# Patient Record
Sex: FEMALE | Race: WHITE
[De-identification: names, ages, dates, MRNs, and addresses within clinical notes are randomized per-mention and may not be internally consistent; named-entity substitution may affect disease eponyms.]

---

## 2018-03-15 ENCOUNTER — HOSPITAL ENCOUNTER (EMERGENCY)
Dept: HOSPITAL 41 - JD.ED | Age: 73
Discharge: HOME | End: 2018-03-15
Payer: MEDICARE

## 2018-03-15 VITALS — DIASTOLIC BLOOD PRESSURE: 76 MMHG | SYSTOLIC BLOOD PRESSURE: 159 MMHG

## 2018-03-15 DIAGNOSIS — R10.10: Primary | ICD-10-CM

## 2018-03-15 DIAGNOSIS — Z88.0: ICD-10-CM

## 2018-03-15 DIAGNOSIS — Z88.5: ICD-10-CM

## 2018-03-15 DIAGNOSIS — I10: ICD-10-CM

## 2018-03-15 DIAGNOSIS — Z79.899: ICD-10-CM

## 2018-03-15 DIAGNOSIS — E03.9: ICD-10-CM

## 2018-03-15 NOTE — EDM.PDOC
ED HPI GENERAL MEDICAL PROBLEM





- General


Chief Complaint: Abdominal Pain


Stated Complaint: ABDOMINAL PAIN


Time Seen by Provider: 03/15/18 10:13


Source of Information: Reports: Patient, RN Notes Reviewed





- History of Present Illness


INITIAL COMMENTS - FREE TEXT/NARRATIVE: 





72 year old female comes in with intermitant upper abd pain and pain  LLQ for 

about 1 week.  Was worse this morning, now almost gone.  No fever or chills.  

No N/V or diarrhea,  occas. constipation, last BM yesterday.  No chest pain or 

difficulty breathing.  Hx of prior appy about 8 yrs ago.  


  ** Abdominal


Pain Score (Numeric/FACES): 7





- Related Data


 Allergies











Allergy/AdvReac Type Severity Reaction Status Date / Time


 


hydromorphone HCl Allergy  Syncope Verified 03/15/18 10:12





[From Dilaudid]     


 


Penicillins Allergy  Hives Verified 03/15/18 10:12











Home Meds: 


 Home Meds





Escitalopram Oxalate [Lexapro] 20 mg PO DAILY 07/04/16 [History]


Levothyroxine 175 mcg PO ACBRK 07/04/16 [History]


Lisinopril/Hydrochlorothiazide [Lisinopril-Hctz 20-12.5 mg Tab] 1 tab PO BID 07/ 04/16 [History]











Past Medical History


Cardiovascular History: Reports: Hypertension


OB/GYN History: Reports: Pregnancy


Musculoskeletal History: Reports: Arthritis


Psychiatric History: Reports: Anxiety


Endocrine/Metabolic History: Reports: Hypothyroidism





- Infectious Disease History


Infectious Disease History: Reports: Chicken Pox, Measles, Mumps





- Past Surgical History


GI Surgical History: Reports: Appendectomy, Other (See Below)


Other GI Surgeries/Procedures: peritonitis from ruptured appendix





Social & Family History





- Tobacco Use


Smoking Status *Q: Never Smoker





- Recreational Drug Use


Recreational Drug Use: No





ED ROS GENERAL





- Review of Systems


Review Of Systems: See Below


Constitutional: Denies: Fever, Chills


HEENT: Denies: Throat Pain


Respiratory: Denies: Shortness of Breath, Pleuritic Chest Pain, Cough


Cardiovascular: Denies: Chest Pain, Palpitations


GI/Abdominal: Reports: Abdominal Pain.  Denies: Constipation, Diarrhea, 

Decreased Appetite, Nausea, Vomiting


Musculoskeletal: Reports: No Symptoms


Skin: Reports: No Symptoms


Neurological: Reports: No Symptoms





ED EXAM, GI/ABD





- Physical Exam


Exam: See Below


General Appearance: Alert, No Apparent Distress


Throat/Mouth: Normal Inspection


Head: Atraumatic


Neck: Supple


Respiratory/Chest: No Respiratory Distress, Lungs Clear, Normal Breath Sounds


Cardiovascular: Normal Peripheral Pulses, Regular Rate, Rhythm


GI/Abdominal Exam: Soft, Other (minimal tenderness upper mid abd,  LLQ and 

remainder of abd nontender at this time)


Back Exam: No: CVA Tenderness (L), CVA Tenderness (R)


Extremities: Normal Inspection.  No: Pedal Edema, Leg Pain


Neurological: Alert, Oriented, No Motor/Sensory Deficits





Course





- Vital Signs


Last Recorded V/S: 


 Last Vital Signs











Temp  98.5 F   03/15/18 10:12


 


Pulse  77   03/15/18 10:12


 


Resp  20   03/15/18 10:12


 


BP  159/76 H  03/15/18 10:12


 


Pulse Ox  96   03/15/18 10:12














- Orders/Labs/Meds


Orders: 


 Active Orders 24 hr











 Category Date Time Status


 


 Abdomen 2V AP Flat Upright [CR] Stat Exams  03/15/18 11:09 Taken











Labs: 


 Laboratory Tests











  03/15/18 03/15/18 Range/Units





  11:51 11:51 


 


WBC   6.57  (3.98-10.04)  K/mm3


 


RBC   4.32  (3.98-5.22)  M/mm3


 


Hgb   12.5  (11.2-15.7)  gm/L


 


Hct   38.9  (34.1-44.9)  %


 


MCV   90.0  (79.4-94.8)  fl


 


MCH   28.9  (25.6-32.2)  pg


 


MCHC   32.1 L  (32.2-35.5)  g/dl


 


RDW Std Deviation   44.4  (36.4-46.3)  fL


 


Plt Count   249  (182-369)  K/mm3


 


MPV   10.8  (9.4-12.3)  fl


 


Neut % (Auto)   64.9  (34.0-71.1)  %


 


Lymph % (Auto)   24.7  (19.3-51.7)  %


 


Mono % (Auto)   6.2  (4.7-12.5)  %


 


Eos % (Auto)   3.2  (0.7-5.8)  


 


Baso % (Auto)   0.5  (0.1-1.2)  %


 


Neut # (Auto)   4.27  (1.56-6.13)  K/mm3


 


Lymph # (Auto)   1.62  (1.18-3.74)  K/mm3


 


Mono # (Auto)   0.41 H  (0.24-0.36)  K/mm3


 


Eos # (Auto)   0.21  (0.04-0.36)  K/mm3


 


Baso # (Auto)   0.03  (0.01-0.08)  K/mm3


 


C-Reactive Protein  1.9 H*   (<1.0)  mg/dL














- Re-Assessments/Exams


Free Text/Narrative Re-Assessment/Exam: 





03/15/18 15:10


WBC good, CRP very mildly elevated,  I did not feel any sign of hernia on 

initial exam,  on repeat exam when sitting she does have a bit of a buldge to 

the L of her inciscion when pressure applied to abd wall.  X rays are fine.  

Discharge instr. as documeted. 





Departure





- Departure


Time of Disposition: 12:53


Disposition: Home, Self-Care 01


Condition: Fair


Clinical Impression: 


Abdominal pain


Qualifiers:


 Abdominal location: upper abdomen, unspecified Qualified Code(s): R10.10 - 

Upper abdominal pain, unspecified








- Discharge Information


Instructions:  Abdominal Pain, Adult, Easy-to-Read


Referrals: 


PCP,Not In Area [Primary Care Provider] - 


Forms:  ED Department Discharge


Additional Instructions: 


start a stool softener once or twice daily,  drink plenty of water to maintain 

hydration, high fiber diet,  See Dr Sullivan, General Surgeon at our [facility] medical 

clinic next available appt.  Call [phone] for appt.   Return to ED as needed 

if symptoms worsening in any way.  





- My Orders


Last 24 Hours: 


My Active Orders





03/15/18 11:09


Abdomen 2V AP Flat Upright [CR] Stat 














- Assessment/Plan


Last 24 Hours: 


My Active Orders





03/15/18 11:09


Abdomen 2V AP Flat Upright [CR] Stat

## 2018-03-16 NOTE — CR
Abdomen: Supine and upright views of the abdomen were obtained.

 

Comparison: No previous abdominal x-ray.

 

Bowel gas pattern appears normal.  Slight scoliosis is present within 

the spine with minimal degenerative change.  Small calcification is 

seen within the left pelvis compatible with incidental phlebolith.  No

 other abnormal calcifications are seen.  No soft tissue abnormality 

is seen.  No free air is seen.

 

Impression:

1.  Incidental findings.

 

Diagnostic code #2

## 2018-04-01 ENCOUNTER — HOSPITAL ENCOUNTER (INPATIENT)
Dept: HOSPITAL 41 - JD.ED | Age: 73
LOS: 2 days | Discharge: HOME | DRG: 309 | End: 2018-04-03
Payer: MEDICARE

## 2018-04-01 DIAGNOSIS — M25.569: ICD-10-CM

## 2018-04-01 DIAGNOSIS — N20.0: ICD-10-CM

## 2018-04-01 DIAGNOSIS — E03.9: ICD-10-CM

## 2018-04-01 DIAGNOSIS — E05.80: ICD-10-CM

## 2018-04-01 DIAGNOSIS — E66.01: ICD-10-CM

## 2018-04-01 DIAGNOSIS — I10: ICD-10-CM

## 2018-04-01 DIAGNOSIS — F41.9: ICD-10-CM

## 2018-04-01 DIAGNOSIS — E83.42: ICD-10-CM

## 2018-04-01 DIAGNOSIS — I48.91: Primary | ICD-10-CM

## 2018-04-01 DIAGNOSIS — I95.2: ICD-10-CM

## 2018-04-01 DIAGNOSIS — T43.615A: ICD-10-CM

## 2018-04-01 DIAGNOSIS — Z88.8: ICD-10-CM

## 2018-04-01 DIAGNOSIS — F32.9: ICD-10-CM

## 2018-04-01 DIAGNOSIS — Z88.0: ICD-10-CM

## 2018-04-01 DIAGNOSIS — Z79.899: ICD-10-CM

## 2018-04-01 DIAGNOSIS — M19.90: ICD-10-CM

## 2018-04-01 DIAGNOSIS — R79.1: ICD-10-CM

## 2018-04-01 DIAGNOSIS — Z79.82: ICD-10-CM

## 2018-04-01 PROCEDURE — 85025 COMPLETE CBC W/AUTO DIFF WBC: CPT

## 2018-04-01 PROCEDURE — 99285 EMERGENCY DEPT VISIT HI MDM: CPT

## 2018-04-01 PROCEDURE — 83880 ASSAY OF NATRIURETIC PEPTIDE: CPT

## 2018-04-01 PROCEDURE — 81001 URINALYSIS AUTO W/SCOPE: CPT

## 2018-04-01 PROCEDURE — 85379 FIBRIN DEGRADATION QUANT: CPT

## 2018-04-01 PROCEDURE — 96365 THER/PROPH/DIAG IV INF INIT: CPT

## 2018-04-01 PROCEDURE — 85610 PROTHROMBIN TIME: CPT

## 2018-04-01 PROCEDURE — 71045 X-RAY EXAM CHEST 1 VIEW: CPT

## 2018-04-01 PROCEDURE — 80053 COMPREHEN METABOLIC PANEL: CPT

## 2018-04-01 PROCEDURE — 84443 ASSAY THYROID STIM HORMONE: CPT

## 2018-04-01 PROCEDURE — 85730 THROMBOPLASTIN TIME PARTIAL: CPT

## 2018-04-01 PROCEDURE — 36415 COLL VENOUS BLD VENIPUNCTURE: CPT

## 2018-04-01 PROCEDURE — 84439 ASSAY OF FREE THYROXINE: CPT

## 2018-04-01 PROCEDURE — 84484 ASSAY OF TROPONIN QUANT: CPT

## 2018-04-01 PROCEDURE — 71275 CT ANGIOGRAPHY CHEST: CPT

## 2018-04-01 PROCEDURE — 83036 HEMOGLOBIN GLYCOSYLATED A1C: CPT

## 2018-04-01 PROCEDURE — 96376 TX/PRO/DX INJ SAME DRUG ADON: CPT

## 2018-04-01 PROCEDURE — 96375 TX/PRO/DX INJ NEW DRUG ADDON: CPT

## 2018-04-01 PROCEDURE — 83735 ASSAY OF MAGNESIUM: CPT

## 2018-04-01 RX ADMIN — POTASSIUM CHLORIDE SCH MEQ: 1500 TABLET, EXTENDED RELEASE ORAL at 20:24

## 2018-04-01 RX ADMIN — POTASSIUM CHLORIDE SCH MEQ: 1500 TABLET, EXTENDED RELEASE ORAL at 18:15

## 2018-04-01 NOTE — CT
CT chest

 

Technique: Multiple axial sections through the chest were obtained.  

Intravenous contrast was utilized.  Study has been performed as a 

pulmonary angiogram protocol.

 

Comparison: Prior chest x-ray performed on the same day at time (12:34

 PM)

 

Findings: Pulmonary arteries are well-opacified.  There are no filling

 defects seen to indicate pulmonary embolism.  Mild coronary artery 

calcification is seen.  Aorta shows no aneurysmal dilatation or 

dissection.  Mediastinum and hilar regions show no adenopathy or mass.

  No axillary adenopathy is seen.  No pericardial thickening is 

identified.  Increased density is noted within the left kidney.  This 

increased density appears to represent a partially visualized staghorn

 calculus, noncontrast CT would be needed to confirm.  Other portions 

of the visualized upper abdominal structures shows a small hiatal 

hernia but otherwise appear unremarkable.

 

Lungs are clear.  No pleural effusions or acute parenchymal changes 

seen.  No pneumothorax is seen.

 

Bone window settings were reviewed which shows mild scattered 

degenerative change within the spine.

 

Impression:

1.  No findings of pulmonary embolism.

2.  Questionable partially visualized staghorn calculus within the 

left kidney.  Noncontrast CT abdomen study could be obtained to 

confirm if clinically needed.  This can be performed non-emergently.

3.  Other incidental findings.

 

Diagnostic code #2

## 2018-04-01 NOTE — PCM.HP
H&P History of Present Illness





- General


Date of Service: 04/01/18


Admit Problem/Dx: 


New Onset of Atrial Fibrillation with RVR





Source of Information: Patient, Old Records, Provider, RN Notes Reviewed


History Limitations: Reports: No Limitations





- History of Present Illness


Initial Comments - Free Text/Narative: 


This is a 73 yo elderly white female with past medical hx/o HTN, OA/DJD, 

Hypothyroidism and Morbid Obesity who comes in to day for heart palpitations 

that has been going on for a month now, on and off. She does not follow a 

cardiologist and she lives [place] [facility]. She is a retired nurse and 

according to her, if her heart palpitation comes on, she usually terminates on 

her own by hitting her chest and with abrupt short cough.





Upon presentation to ED, she was found with a HR in the 150s. Her initial work 

up in ED shows a CBC remarkable for WBC of 10.05, and Lymphocytes of 41%. Her 

coagulation studies show a PT of 10.7, INR of 1, APTT of 28 and D-Dimer of 

1.11. Her chemistry is significant for AG of 17.8, BUN of 24, BS of 120, Mg of 

1.6, ProBNP of 348, FT4 of 1.73 and TSH of 0.318. Her UA is negative for UTI. 

Her CXR and Chest CT scan both show no acute abnormal findings.





Patient is being admitted for medical management of atrial fibrillation with 

rvr. She is full code.


  








- Related Data


Allergies/Adverse Reactions: 


 Allergies











Allergy/AdvReac Type Severity Reaction Status Date / Time


 


Penicillins Allergy  Hives Verified 04/01/18 17:26


 


hydromorphone HCl AdvReac  Syncope Verified 04/02/18 07:20





[From Dilaudid]     











Home Medications: 


 Home Meds





Escitalopram Oxalate [Lexapro] 20 mg PO DAILY 07/04/16 [History]


Levothyroxine 175 mcg PO ACBRK 07/04/16 [History]


Lisinopril/Hydrochlorothiazide [Lisinopril-Hctz 20-12.5 mg Tab] 1 tab PO BID 07/ 04/16 [History]


Acetaminophen [Tylenol Extra Strength] 500 mg PO BID 04/01/18 [History]











Past Medical History


Cardiovascular History: Reports: Hypertension


OB/GYN History: Reports: Pregnancy


Musculoskeletal History: Reports: Arthritis


Psychiatric History: Reports: Anxiety


Endocrine/Metabolic History: Reports: Hypothyroidism





- Infectious Disease History


Infectious Disease History: Reports: Chicken Pox, Measles, Mumps





- Past Surgical History


GI Surgical History: Reports: Appendectomy, Hernia, Abdominal, Other (See Below)


Other GI Surgeries/Procedures: peritonitis from ruptured appendix





Social & Family History





- Tobacco Use


Smoking Status *Q: Never Smoker





- Caffeine Use


Caffeine Use: Reports: Coffee, Soda, Tea





- Recreational Drug Use


Recreational Drug Use: No





H&P Review of Systems





- Review of Systems:


Review Of Systems: See Below


General: Denies: Fever, Chills, Malaise, Weakness, Fatigue


HEENT: Reports: No Symptoms


Pulmonary: Denies: Shortness of Breath


Cardiovascular: Reports: Palpitations, Dyspnea on Exertion, Edema.  Denies: 

Chest Pain, Orthopnea, Lightheadedness, Syncope, Claudication, Blood Pressure 

Problem


Gastrointestinal: Denies: Abdominal Pain, Decreased Appetite, Nausea, Vomiting


Genitourinary: Reports: No Symptoms


Musculoskeletal: Reports: Joint Pain, Other (knee pain)


Skin: Denies: Cyanosis


Psychiatric: Denies: Depression, Anxiety, Agitation, Hallucinations


Neurological: Reports: Gait Disturbance.  Denies: Confusion, Difficulty Walking

, Weakness


Hematologic/Lymphatic: Reports: No Symptoms


Immunologic: Reports: No Symptoms





Exam





- Exam


Exam: See Below





- Vital Signs


Vital Signs: 


 Last Vital Signs











Temp  35.9 C   04/01/18 12:20


 


Pulse  156 H  04/01/18 12:20


 


Resp  23 H  04/01/18 12:20


 


BP  155/109 H  04/01/18 12:20


 


Pulse Ox  96   04/01/18 12:20











Weight: 141.974 kg





- Exam


General: Alert, Oriented, Cooperative


HEENT: Conjunctiva Clear, EACs Clear, EOMI, Hearing Intact, Mucosa Moist & Pink

, Nares Patent, Normal Nasal Septum, Posterior Pharynx Clear, Pupils Equal, 

Pupils Reactive


Neck: Supple, Trachea Midline


Lungs: Normal Respiratory Effort, Decreased Breath Sounds


Cardiovascular: Irregular Rhythm.  No: Systolic Murmur


GI/Abdominal Exam: Normal Bowel Sounds, Soft, Non-Tender, No Organomegaly, No 

Distention, No Abnormal Bruit, No Mass


 (Female) Exam: Deferred


Rectal (Female) Exam: Deferred


Back Exam: Normal Inspection, Decreased Range of Motion


Extremities: Normal Inspection, Normal Range of Motion, Non-Tender, No Pedal 

Edema, Normal Capillary Refill, Other (trace bilateral lower extremity edema)


Peripheral Pulses: 3+: Posterior Tibial (L), Posterior Tibial (R), Dorsalis 

Pedis (L), Dorsalis Pedis (R)


Skin: Warm, Dry, Intact


Neuro Extensive - Mental Status: Oriented x3, Normal Cognition, Memory Intact


Neuro Extensive - Motor, Sensory, Reflexes: CN II-XII Intact.  No: Normal Gait


Psychiatric: Alert, Normal Affect, Normal Mood





- Patient Data


Lab Results Last 24 hrs: 


 Laboratory Results - last 24 hr











  04/01/18 04/01/18 04/01/18 Range/Units





  12:24 12:24 12:24 


 


WBC  10.05 H    (3.98-10.04)  K/mm3


 


RBC  4.32    (3.98-5.22)  M/mm3


 


Hgb  12.4    (11.2-15.7)  gm/L


 


Hct  37.8    (34.1-44.9)  %


 


MCV  87.5    (79.4-94.8)  fl


 


MCH  28.7    (25.6-32.2)  pg


 


MCHC  32.8    (32.2-35.5)  g/dl


 


RDW Std Deviation  41.7    (36.4-46.3)  fL


 


Plt Count  296    (182-369)  K/mm3


 


MPV  10.7    (9.4-12.3)  fl


 


Neutrophils % (Manual)  52    (40-60)  %


 


Band Neutrophils %  0    (0-10)  %


 


Lymphocytes % (Manual)  41 H    (20-40)  %


 


Atypical Lymphs %  0    %


 


Monocytes % (Manual)  4    (2-10)  %


 


Eosinophils % (Manual)  3    (0.7-5.8)  %


 


Basophils % (Manual)  0 L    (0.1-1.2)  


 


Platelet Estimate  Adequate    


 


RBC Morph Comment  Normal    


 


PT     (8.0-13.0)  SECONDS


 


INR     


 


APTT     (22-36)  SECONDS


 


D-Dimer, Quantitative     (0.19-0.59)  mg/L


 


Sodium   139   (136-145)  mEq/L


 


Potassium   3.8   (3.5-5.1)  mEq/L


 


Chloride   101   ()  mEq/L


 


Carbon Dioxide   24   (21-32)  mEq/L


 


Anion Gap   17.8 H   (5-15)  


 


BUN   24 H   (7-18)  mg/dL


 


Creatinine   0.9   (0.55-1.02)  mg/dL


 


Est Cr Clr Drug Dosing   52.34   mL/min


 


Estimated GFR (MDRD)   > 60   (>60)  mL/min


 


BUN/Creatinine Ratio   26.7 H   (14-18)  


 


Glucose   120 H   ()  mg/dL


 


Calcium   9.0   (8.5-10.1)  mg/dL


 


Magnesium   1.6 L   (1.8-2.4)  mg/dl


 


Total Bilirubin   0.4   (0.2-1.0)  mg/dL


 


AST   15   (15-37)  U/L


 


ALT   19   (14-59)  U/L


 


Alkaline Phosphatase   73   ()  U/L


 


Troponin I   < 0.017   (0.00-0.056)  ng/mL


 


NT-Pro-B Natriuret Pep    348 H  (0-125)  pg/mL


 


Total Protein   7.5   (6.4-8.2)  g/dl


 


Albumin   3.4   (3.4-5.0)  g/dl


 


Globulin   4.1   gm/dL


 


Albumin/Globulin Ratio   0.8 L   (1-2)  


 


TSH 3rd Generation   0.318 L   (0.358-3.74)  uIU/mL














  04/01/18 04/01/18 04/01/18 Range/Units





  12:50 12:50 15:05 


 


WBC     (3.98-10.04)  K/mm3


 


RBC     (3.98-5.22)  M/mm3


 


Hgb     (11.2-15.7)  gm/L


 


Hct     (34.1-44.9)  %


 


MCV     (79.4-94.8)  fl


 


MCH     (25.6-32.2)  pg


 


MCHC     (32.2-35.5)  g/dl


 


RDW Std Deviation     (36.4-46.3)  fL


 


Plt Count     (182-369)  K/mm3


 


MPV     (9.4-12.3)  fl


 


Neutrophils % (Manual)     (40-60)  %


 


Band Neutrophils %     (0-10)  %


 


Lymphocytes % (Manual)     (20-40)  %


 


Atypical Lymphs %     %


 


Monocytes % (Manual)     (2-10)  %


 


Eosinophils % (Manual)     (0.7-5.8)  %


 


Basophils % (Manual)     (0.1-1.2)  


 


Platelet Estimate     


 


RBC Morph Comment     


 


PT  10.7    (8.0-13.0)  SECONDS


 


INR  1.00    


 


APTT  28    (22-36)  SECONDS


 


D-Dimer, Quantitative   1.11 H   (0.19-0.59)  mg/L


 


Sodium     (136-145)  mEq/L


 


Potassium     (3.5-5.1)  mEq/L


 


Chloride     ()  mEq/L


 


Carbon Dioxide     (21-32)  mEq/L


 


Anion Gap     (5-15)  


 


BUN     (7-18)  mg/dL


 


Creatinine     (0.55-1.02)  mg/dL


 


Est Cr Clr Drug Dosing     mL/min


 


Estimated GFR (MDRD)     (>60)  mL/min


 


BUN/Creatinine Ratio     (14-18)  


 


Glucose     ()  mg/dL


 


Calcium     (8.5-10.1)  mg/dL


 


Magnesium     (1.8-2.4)  mg/dl


 


Total Bilirubin     (0.2-1.0)  mg/dL


 


AST     (15-37)  U/L


 


ALT     (14-59)  U/L


 


Alkaline Phosphatase     ()  U/L


 


Troponin I    < 0.017  (0.00-0.056)  ng/mL


 


NT-Pro-B Natriuret Pep     (0-125)  pg/mL


 


Total Protein     (6.4-8.2)  g/dl


 


Albumin     (3.4-5.0)  g/dl


 


Globulin     gm/dL


 


Albumin/Globulin Ratio     (1-2)  


 


TSH 3rd Generation     (0.358-3.74)  uIU/mL











Result Diagrams: 


 04/02/18 05:25





 04/02/18 05:25





EKG INTERPRETATION


EKG Date: 04/01/18


Rhythm: A-Fib


Rate (Beats/Min): 147


QT: Normal


Problem List Initiated/Reviewed/Updated: Yes


Orders Last 24hrs: 


 Active Orders 24 hr











 Category Date Time Status


 


 Ambulate [RC] ASDIRECTED Care  04/01/18 15:33 Ordered


 


 Cardiac Monitoring [RC] CONTINUOUS Care  04/01/18 15:33 Ordered


 


 Height and Weight [RC] DAILY Care  04/01/18 15:33 Ordered


 


 Intake and Output [RC] QSHIFT Care  04/01/18 15:33 Ordered


 


 Oxygen Therapy [RC] ASDIRECTED Care  04/01/18 12:25 Active


 


 Oxygen Therapy [RC] PRN Care  04/01/18 15:33 Ordered


 


 Peripheral IV Care [RC] .AS DIRECTED Care  04/01/18 12:26 Active


 


 RT Aerosol Therapy [RC] ASDIRECTED Care  04/01/18 15:34 Ordered


 


 Up ad Nuris [RC] ASDIRECTED Care  04/01/18 15:33 Ordered


 


 VTE/DVT Education [RC] PER UNIT ROUTINE Care  04/01/18 15:33 Ordered


 


 Vital Signs [RC] Q4H Care  04/01/18 15:33 Ordered


 


 Consult to Case Management [CONS] Routine Cons  04/01/18 15:34 Ordered


 


 Consult to Dietician [CONS] Routine Cons  04/01/18 15:34 Ordered


 


 Consult to  [CONS] Routine Cons  04/01/18 15:34 Ordered


 


 Heart Healthy Diet [DIET] Diet  04/01/18 Dinner Ordered


 


 Chest 1V Frontal [CR] Stat Exams  04/01/18 12:26 Taken


 


 A1C [GLYCOSYLATED HEMOGLOBIN,HGBA1C] [CHEM] Stat Lab  04/01/18 15:32 Ordered


 


 BASIC METABOLIC PANEL,BMP [CHEM] AM Lab  04/02/18 05:11 Ordered


 


 BASIC METABOLIC PANEL,BMP [CHEM] AM Lab  04/03/18 05:11 Ordered


 


 BASIC METABOLIC PANEL,BMP [CHEM] AM Lab  04/04/18 05:11 Ordered


 


 CBC WITH AUTO DIFF [HEME] AM Lab  04/02/18 05:11 Ordered


 


 DD [D-DIMER QUANTITATIVE] [COAG] Stat Lab  04/01/18 12:50 Ordered


 


 MAGNESIUM [CHEM] AM Lab  04/02/18 05:11 Ordered


 


 MAGNESIUM [CHEM] AM Lab  04/03/18 05:11 Ordered


 


 MAGNESIUM [CHEM] AM Lab  04/04/18 05:11 Ordered


 


 T4 FREE [CHEM] Stat Lab  04/01/18 15:27 Ordered


 


 UA W/MICROSCOPIC [URIN] Stat Lab  04/01/18 12:26 Ordered


 


 Acetaminophen [Tylenol] Med  04/01/18 15:33 Ordered





 650 mg PO Q4H PRN   


 


 Acetaminophen/HYDROcodone [Norco 325-5 MG] Med  04/01/18 15:33 Ordered





 1 tab PO Q4H PRN   


 


 Albuterol/Ipratropium [DuoNeb 3.0-0.5 MG/3 ML] Med  04/01/18 15:33 Ordered





 3 ml NEB Q4H PRN   


 


 Bisacodyl [Dulcolax] Med  04/01/18 15:33 Ordered





 5 mg PO DAILY PRN   


 


 Diltiazem 125 mg Med  04/01/18 15:15 Active





 Sodium Chloride 0.9% [Normal Saline] 100 ml   





 IV TITRATE   


 


 Docusate Sodium [Colace] Med  04/01/18 15:33 Ordered





 100 mg PO BID PRN   


 


 Docusate Sodium/Sennosides [Senna Plus] Med  04/01/18 15:33 Ordered





 1 tab PO BID PRN   


 


 Escitalopram Oxalate Med  04/02/18 09:00 Active





 20 mg PO DAILY   


 


 LORazepam [Ativan] Med  04/01/18 15:33 Ordered





 1 mg IV Q6H PRN   


 


 Levothyroxine Med  04/02/18 06:00 Active





 175 mcg PO ACBRK   


 


 Lisinopril/Hydrochlorothiazide [Lisinopril-Hctz 20-12.5 Med  04/01/18 21:00 

Active





 mg Tab]   





 1 tab PO BID   


 


 Metoprolol Tartrate [Lopressor] Med  04/01/18 15:32 Ordered





 5 mg IVPUSH Q4H PRN   


 


 Metoprolol Tartrate [Lopressor] Med  04/01/18 21:00 Ordered





 50 mg PO Q12HR   


 


 Ondansetron [Zofran] Med  04/01/18 15:33 Ordered





 4 mg IV Q6H PRN   


 


 Polyethylene Glycol 3350 [MiraLAX] Med  04/01/18 15:33 Ordered





 17 gm PO DAILY PRN   


 


 Potassium Rep Pharmacy to Dose [Pharmacy to Dose - Med  04/01/18 15:45 Ordered





 Potassium Replacement]   





 1 dose .XX ASDIRECTED   


 


 Promethazine [Phenergan] 12.5 mg Med  04/01/18 15:33 Ordered





 Sodium Chloride 0.9% [Normal Saline] 50 ml   





 IV Q6H   


 


 Sodium Chloride 0.9% [Normal Saline] 1,000 ml Med  04/01/18 12:45 Active





 IV ASDIRECTED   


 


 Sodium Chloride 0.9% [Normal Saline] 250 ml Med  04/01/18 14:30 Active





 IV ASDIRECTED   


 


 Sodium Chloride 0.9% [Saline Flush] Med  04/01/18 12:26 Active





 10 ml FLUSH ASDIRECTED PRN   


 


 Sodium Chloride 0.9% [Saline Flush] Med  04/01/18 14:18 Active





 10 ml FLUSH ONETIME PRN   


 


 Temazepam [Restoril] Med  04/01/18 15:33 Ordered





 7.5 mg PO BEDTIME PRN   


 


 hydrALAZINE [Apresoline] Med  04/01/18 15:32 Ordered





 20 mg IVPUSH Q4H PRN   


 


 Peripheral IV Insertion Adult [OM.PC] Routine Oth  04/01/18 12:26 Ordered


 


 Resuscitation Status Routine Resus Stat  04/01/18 15:33 Ordered








 Medication Orders





Acetaminophen (Tylenol)  650 mg PO Q4H PRN


   PRN Reason: Pain (Mild 1-3)/fever


Hydrocodone Bitart/Acetaminophen (Norco 325-5 Mg)  1 tab PO Q4H PRN


   PRN Reason: Pain (moderate 4-6)


Albuterol/Ipratropium (Duoneb 3.0-0.5 Mg/3 Ml)  3 ml NEB Q4H PRN


   PRN Reason: Shortness Of Breath/wheezing


Bisacodyl (Dulcolax)  5 mg PO DAILY PRN


   PRN Reason: Constipation


Docusate Sodium (Colace)  100 mg PO BID PRN


   PRN Reason: Constipation


Hydralazine HCl (Apresoline)  20 mg IVPUSH Q4H PRN


   PRN Reason: Hypertension


Sodium Chloride (Normal Saline)  1,000 mls @ 250 mls/hr IV ASDIRECTED DREW


   Last Admin: 04/01/18 12:58  Dose: 250 mls/hr


Sodium Chloride (Normal Saline)  250 mls @ 80 mls/min IV ASDIRECTED DREW


   Last Admin: 04/01/18 14:51  Dose: 80 mls/min


Diltiazem HCl 125 mg/ Sodium (Chloride)  125 mls @ 5 mls/hr IV TITRATE DREW; 

Protocol


Promethazine HCl 12.5 mg/ (Sodium Chloride)  50.5 mls @ 100 mls/hr IV Q6H PRN


   PRN Reason: Nausea/Vomiting


Levothyroxine Sodium (Levothyroxine)  175 mcg PO ACBRK DREW


Lorazepam (Ativan)  1 mg IV Q6H PRN


   PRN Reason: Anxiety


Metoprolol Tartrate (Lopressor)  5 mg IVPUSH Q4H PRN


   PRN Reason: Tachycardia


Metoprolol Tartrate (Lopressor)  50 mg PO Q12HR [facility]


Non-Formulary Medication (Escitalopram Oxalate)  20 mg PO DAILY [facility]


Non-Formulary Medication (Lisinopril/Hydrochlorothiazide [Lisinopril-Hctz 20-

12.5 Mg Tab])  1 tab PO BID [facility]


Ondansetron HCl (Zofran)  4 mg IV Q6H PRN


   PRN Reason: Nausea/Vomiting


Polyethylene Glycol (Miralax)  17 gm PO DAILY PRN


   PRN Reason: Constipation


Potassium Chloride (Pharmacy To Dose - Potassium Replacement)  1 dose .XX 

ASDIRECTED [facility]


Senna/Docusate Sodium (Senna Plus)  1 tab PO BID PRN


   PRN Reason: Constipation


Sodium Chloride (Saline Flush)  10 ml FLUSH ASDIRECTED PRN


   PRN Reason: Keep Vein Open


   Last Admin: 04/01/18 12:57  Dose: 10 ml


Sodium Chloride (Saline Flush)  10 ml FLUSH ONETIME PRN


   PRN Reason: IV FLUSH


   Last Admin: 04/01/18 14:42  Dose: 10 ml


Temazepam (Restoril)  7.5 mg PO BEDTIME PRN


   PRN Reason: Sleep








Assessment/Plan Comment:: 


Assessment/Plan:


Acute:


Atrial Fibrillation with RVR


   - New onset


   - Likely 2/2 thyroid toxicity + large amount of caffeine intake


   - TSH 0.318 and FT4 1.73; she admits to drinking a pitcher of caffeinated 

tea daily


   - She is currently on Cardizem drip, with borderline BP; may need to switch 

her to BB


   - Will cut down her thyroid dose by 25 mg dose and advised to limit her 

intake of tea after discharge


   - 2D echo in AM for baseline


   - [id number] score is 3-->Moderate-High risk for stroke w/o anti-

coagulation


   - Discussed Traditional vs DOACs treatment to include risks and benefits; 

she elected DOACS with preference to Eliquis 5 mg po BID to start tonight





Elevated D-Dimer


   - 2/2 above


   - D-dimer of 1.11


   - Chest CTA-negative for PE


   - Troponin x2 negative





 Hypomagnesemia


   - Mg 1.6


   - 2/2 inadequate intake


   - Replete and monitor





Chest CT can Abnormal Findings


   - Questionable partially visualized staghorn calculus within the left kidney


   - Recommend CT abdomen w/o contrast





Chronic:


HTN


Hypothyroidism


OA/DJD


Knee Pain


Anxiety


Morbid Obesity with BMI of 50.9





Plan:


Admit to ICU


Cardizem gtt titrate to keep HR < 100; monitor BP if she is intolerant; we may 

need to switch her to BB (better treatment for thyroid induced)


2D echo in AM for baseline


Metroprolol 50 mg po BID


Lopressor 5mg IVP Q4H PRN for HR > 110


Resume Home Meds


Dietary consult for weight management


Screen for Diabetes


Routine AM Labs


PT/OT consult


SW/CM for d/c planning


Code status: 1

## 2018-04-02 NOTE — CR
Chest: Portable view of the chest was obtained.

 

Comparison: No prior chest x-ray.

 

Heart is somewhat enlarged.  Tortuous thoracic aorta is seen.  

Pulmonary vasculature appears within normal limits for portable 

technique.  Lungs show no acute parenchymal change.  Bony structures 

are osteopenic.  Minimal scoliosis is noted within the spine.

 

Impression:

1.  Cardiomegaly.  Nothing acute is appreciated on portable chest 

x-ray.

 

Diagnostic code #2

## 2018-04-02 NOTE — PCM.PN
- General Info


Date of Service: 04/02/18


Admission Dx/Problem (Free Text): 


New Onset of Atrial Fibrillation with RVR





Subjective Update: 


Follow Up





Functional Status: Reports: Pain Controlled, Tolerating Diet, Ambulating, 

Urinating





- Review of Systems


General: Denies: Fever, Weakness, Fatigue, Malaise, Chills


HEENT: Reports: No Symptoms


Pulmonary: Denies: Shortness of Breath


Cardiovascular: Denies: Palpitations, Dyspnea on Exertion, Edema, 

Lightheadedness


Gastrointestinal: Denies: Abdominal Pain, Nausea, Vomiting


Genitourinary: Reports: No Symptoms


Musculoskeletal: Reports: No Symptoms


Skin: Denies: Cyanosis, Mottled, Pallor, Diaphoresis, Bruising


Neurological: Reports: Gait Disturbance.  Denies: Confusion, Difficulty Walking

, Weakness


Psychiatric: Denies: Depression, Anxiety, Agitation, Hallucinations


Systems Review Comment:: 


She did not sleep good last night because she could not get comfortable. She 

was offered sleeping pill but refused it. However she chemically converted at 

about 3 am with HRs in the 70s. Her repeat ekg this am shows sinus rhythm. She 

reports no heart palpitations, chest pain or shortness of breath.








- Patient Data


Vitals - Most Recent: 


 Last Vital Signs











Temp  37.1 C   04/02/18 04:00


 


Pulse  59 L  04/02/18 05:00


 


Resp  16   04/02/18 04:00


 


BP  92/56 L  04/02/18 05:00


 


Pulse Ox  94 L  04/02/18 04:00











Weight - Most Recent: 141.475 kg


I&O - Last 24 Hours: 


 Intake & Output











 04/01/18 04/01/18 04/02/18





 14:59 22:59 06:59


 


Intake Total  300 515


 


Output Total  200 350


 


Balance  100 165











Lab Results Last 24 Hours: 


 Laboratory Results - last 24 hr











  04/01/18 04/01/18 04/01/18 Range/Units





  12:24 12:24 12:24 


 


WBC  10.05 H    (3.98-10.04)  K/mm3


 


RBC  4.32    (3.98-5.22)  M/mm3


 


Hgb  12.4    (11.2-15.7)  gm/L


 


Hct  37.8    (34.1-44.9)  %


 


MCV  87.5    (79.4-94.8)  fl


 


MCH  28.7    (25.6-32.2)  pg


 


MCHC  32.8    (32.2-35.5)  g/dl


 


RDW Std Deviation  41.7    (36.4-46.3)  fL


 


Plt Count  296    (182-369)  K/mm3


 


MPV  10.7    (9.4-12.3)  fl


 


Neut % (Auto)     (34.0-71.1)  %


 


Lymph % (Auto)     (19.3-51.7)  %


 


Mono % (Auto)     (4.7-12.5)  %


 


Eos % (Auto)     (0.7-5.8)  


 


Baso % (Auto)     (0.1-1.2)  %


 


Neut # (Auto)     (1.56-6.13)  K/mm3


 


Lymph # (Auto)     (1.18-3.74)  K/mm3


 


Mono # (Auto)     (0.24-0.36)  K/mm3


 


Eos # (Auto)     (0.04-0.36)  K/mm3


 


Baso # (Auto)     (0.01-0.08)  K/mm3


 


Neutrophils % (Manual)  52    (40-60)  %


 


Band Neutrophils %  0    (0-10)  %


 


Lymphocytes % (Manual)  41 H    (20-40)  %


 


Atypical Lymphs %  0    %


 


Monocytes % (Manual)  4    (2-10)  %


 


Eosinophils % (Manual)  3    (0.7-5.8)  %


 


Basophils % (Manual)  0 L    (0.1-1.2)  


 


Platelet Estimate  Adequate    


 


RBC Morph Comment  Normal    


 


PT     (8.0-13.0)  SECONDS


 


INR     


 


APTT     (22-36)  SECONDS


 


D-Dimer, Quantitative     (0.19-0.59)  mg/L


 


Sodium   139   (136-145)  mEq/L


 


Potassium   3.8   (3.5-5.1)  mEq/L


 


Chloride   101   ()  mEq/L


 


Carbon Dioxide   24   (21-32)  mEq/L


 


Anion Gap   17.8 H   (5-15)  


 


BUN   24 H   (7-18)  mg/dL


 


Creatinine   0.9   (0.55-1.02)  mg/dL


 


Est Cr Clr Drug Dosing   52.34   mL/min


 


Estimated GFR (MDRD)   > 60   (>60)  mL/min


 


BUN/Creatinine Ratio   26.7 H   (14-18)  


 


Glucose   120 H   ()  mg/dL


 


Hemoglobin A1c     (4.50-6.20)  %


 


Calcium   9.0   (8.5-10.1)  mg/dL


 


Magnesium   1.6 L   (1.8-2.4)  mg/dl


 


Total Bilirubin   0.4   (0.2-1.0)  mg/dL


 


AST   15   (15-37)  U/L


 


ALT   19   (14-59)  U/L


 


Alkaline Phosphatase   73   ()  U/L


 


Troponin I   < 0.017   (0.00-0.056)  ng/mL


 


NT-Pro-B Natriuret Pep    348 H  (0-125)  pg/mL


 


Total Protein   7.5   (6.4-8.2)  g/dl


 


Albumin   3.4   (3.4-5.0)  g/dl


 


Globulin   4.1   gm/dL


 


Albumin/Globulin Ratio   0.8 L   (1-2)  


 


Free T4     (0.76-1.46)  ng/dL


 


TSH 3rd Generation   0.318 L   (0.358-3.74)  uIU/mL


 


Urine Color     (Yellow)  


 


Urine Appearance     (Clear)  


 


Urine pH     (5.0-8.0)  


 


Ur Specific Gravity     (1.005-1.030)  


 


Urine Protein     (Negative)  


 


Urine Glucose (UA)     (Negative)  


 


Urine Ketones     (Negative)  


 


Urine Occult Blood     (Negative)  


 


Urine Nitrite     (Negative)  


 


Urine Bilirubin     (Negative)  


 


Urine Urobilinogen     (0.2-1.0)  


 


Ur Leukocyte Esterase     (Negative)  


 


Urine RBC     (0-5)  /hpf


 


Urine WBC     (0-5)  /hpf


 


Ur Epithelial Cells     (0-5)  /hpf


 


Urine Bacteria     (FEW)  /hpf


 


Urine Mucus     (FEW)  /hpf














  04/01/18 04/01/18 04/01/18 Range/Units





  12:24 12:50 12:50 


 


WBC     (3.98-10.04)  K/mm3


 


RBC     (3.98-5.22)  M/mm3


 


Hgb     (11.2-15.7)  gm/L


 


Hct     (34.1-44.9)  %


 


MCV     (79.4-94.8)  fl


 


MCH     (25.6-32.2)  pg


 


MCHC     (32.2-35.5)  g/dl


 


RDW Std Deviation     (36.4-46.3)  fL


 


Plt Count     (182-369)  K/mm3


 


MPV     (9.4-12.3)  fl


 


Neut % (Auto)     (34.0-71.1)  %


 


Lymph % (Auto)     (19.3-51.7)  %


 


Mono % (Auto)     (4.7-12.5)  %


 


Eos % (Auto)     (0.7-5.8)  


 


Baso % (Auto)     (0.1-1.2)  %


 


Neut # (Auto)     (1.56-6.13)  K/mm3


 


Lymph # (Auto)     (1.18-3.74)  K/mm3


 


Mono # (Auto)     (0.24-0.36)  K/mm3


 


Eos # (Auto)     (0.04-0.36)  K/mm3


 


Baso # (Auto)     (0.01-0.08)  K/mm3


 


Neutrophils % (Manual)     (40-60)  %


 


Band Neutrophils %     (0-10)  %


 


Lymphocytes % (Manual)     (20-40)  %


 


Atypical Lymphs %     %


 


Monocytes % (Manual)     (2-10)  %


 


Eosinophils % (Manual)     (0.7-5.8)  %


 


Basophils % (Manual)     (0.1-1.2)  


 


Platelet Estimate     


 


RBC Morph Comment     


 


PT   10.7   (8.0-13.0)  SECONDS


 


INR   1.00   


 


APTT   28   (22-36)  SECONDS


 


D-Dimer, Quantitative    1.11 H  (0.19-0.59)  mg/L


 


Sodium     (136-145)  mEq/L


 


Potassium     (3.5-5.1)  mEq/L


 


Chloride     ()  mEq/L


 


Carbon Dioxide     (21-32)  mEq/L


 


Anion Gap     (5-15)  


 


BUN     (7-18)  mg/dL


 


Creatinine     (0.55-1.02)  mg/dL


 


Est Cr Clr Drug Dosing     mL/min


 


Estimated GFR (MDRD)     (>60)  mL/min


 


BUN/Creatinine Ratio     (14-18)  


 


Glucose     ()  mg/dL


 


Hemoglobin A1c  5.60    (4.50-6.20)  %


 


Calcium     (8.5-10.1)  mg/dL


 


Magnesium     (1.8-2.4)  mg/dl


 


Total Bilirubin     (0.2-1.0)  mg/dL


 


AST     (15-37)  U/L


 


ALT     (14-59)  U/L


 


Alkaline Phosphatase     ()  U/L


 


Troponin I     (0.00-0.056)  ng/mL


 


NT-Pro-B Natriuret Pep     (0-125)  pg/mL


 


Total Protein     (6.4-8.2)  g/dl


 


Albumin     (3.4-5.0)  g/dl


 


Globulin     gm/dL


 


Albumin/Globulin Ratio     (1-2)  


 


Free T4     (0.76-1.46)  ng/dL


 


TSH 3rd Generation     (0.358-3.74)  uIU/mL


 


Urine Color     (Yellow)  


 


Urine Appearance     (Clear)  


 


Urine pH     (5.0-8.0)  


 


Ur Specific Gravity     (1.005-1.030)  


 


Urine Protein     (Negative)  


 


Urine Glucose (UA)     (Negative)  


 


Urine Ketones     (Negative)  


 


Urine Occult Blood     (Negative)  


 


Urine Nitrite     (Negative)  


 


Urine Bilirubin     (Negative)  


 


Urine Urobilinogen     (0.2-1.0)  


 


Ur Leukocyte Esterase     (Negative)  


 


Urine RBC     (0-5)  /hpf


 


Urine WBC     (0-5)  /hpf


 


Ur Epithelial Cells     (0-5)  /hpf


 


Urine Bacteria     (FEW)  /hpf


 


Urine Mucus     (FEW)  /hpf














  04/01/18 04/01/18 04/01/18 Range/Units





  15:05 15:05 15:20 


 


WBC     (3.98-10.04)  K/mm3


 


RBC     (3.98-5.22)  M/mm3


 


Hgb     (11.2-15.7)  gm/L


 


Hct     (34.1-44.9)  %


 


MCV     (79.4-94.8)  fl


 


MCH     (25.6-32.2)  pg


 


MCHC     (32.2-35.5)  g/dl


 


RDW Std Deviation     (36.4-46.3)  fL


 


Plt Count     (182-369)  K/mm3


 


MPV     (9.4-12.3)  fl


 


Neut % (Auto)     (34.0-71.1)  %


 


Lymph % (Auto)     (19.3-51.7)  %


 


Mono % (Auto)     (4.7-12.5)  %


 


Eos % (Auto)     (0.7-5.8)  


 


Baso % (Auto)     (0.1-1.2)  %


 


Neut # (Auto)     (1.56-6.13)  K/mm3


 


Lymph # (Auto)     (1.18-3.74)  K/mm3


 


Mono # (Auto)     (0.24-0.36)  K/mm3


 


Eos # (Auto)     (0.04-0.36)  K/mm3


 


Baso # (Auto)     (0.01-0.08)  K/mm3


 


Neutrophils % (Manual)     (40-60)  %


 


Band Neutrophils %     (0-10)  %


 


Lymphocytes % (Manual)     (20-40)  %


 


Atypical Lymphs %     %


 


Monocytes % (Manual)     (2-10)  %


 


Eosinophils % (Manual)     (0.7-5.8)  %


 


Basophils % (Manual)     (0.1-1.2)  


 


Platelet Estimate     


 


RBC Morph Comment     


 


PT     (8.0-13.0)  SECONDS


 


INR     


 


APTT     (22-36)  SECONDS


 


D-Dimer, Quantitative     (0.19-0.59)  mg/L


 


Sodium     (136-145)  mEq/L


 


Potassium     (3.5-5.1)  mEq/L


 


Chloride     ()  mEq/L


 


Carbon Dioxide     (21-32)  mEq/L


 


Anion Gap     (5-15)  


 


BUN     (7-18)  mg/dL


 


Creatinine     (0.55-1.02)  mg/dL


 


Est Cr Clr Drug Dosing     mL/min


 


Estimated GFR (MDRD)     (>60)  mL/min


 


BUN/Creatinine Ratio     (14-18)  


 


Glucose     ()  mg/dL


 


Hemoglobin A1c     (4.50-6.20)  %


 


Calcium     (8.5-10.1)  mg/dL


 


Magnesium     (1.8-2.4)  mg/dl


 


Total Bilirubin     (0.2-1.0)  mg/dL


 


AST     (15-37)  U/L


 


ALT     (14-59)  U/L


 


Alkaline Phosphatase     ()  U/L


 


Troponin I  < 0.017    (0.00-0.056)  ng/mL


 


NT-Pro-B Natriuret Pep     (0-125)  pg/mL


 


Total Protein     (6.4-8.2)  g/dl


 


Albumin     (3.4-5.0)  g/dl


 


Globulin     gm/dL


 


Albumin/Globulin Ratio     (1-2)  


 


Free T4   1.73 H   (0.76-1.46)  ng/dL


 


TSH 3rd Generation     (0.358-3.74)  uIU/mL


 


Urine Color    Yellow  (Yellow)  


 


Urine Appearance    Clear  (Clear)  


 


Urine pH    6.0  (5.0-8.0)  


 


Ur Specific Gravity    1.010  (1.005-1.030)  


 


Urine Protein    Negative  (Negative)  


 


Urine Glucose (UA)    Negative  (Negative)  


 


Urine Ketones    Negative  (Negative)  


 


Urine Occult Blood    Negative  (Negative)  


 


Urine Nitrite    Negative  (Negative)  


 


Urine Bilirubin    Negative  (Negative)  


 


Urine Urobilinogen    0.2  (0.2-1.0)  


 


Ur Leukocyte Esterase    Negative  (Negative)  


 


Urine RBC    0-5  (0-5)  /hpf


 


Urine WBC    0-5  (0-5)  /hpf


 


Ur Epithelial Cells    0-5  (0-5)  /hpf


 


Urine Bacteria    Few  (FEW)  /hpf


 


Urine Mucus    Not seen  (FEW)  /hpf














  04/02/18 Range/Units





  05:25 


 


WBC  8.20  (3.98-10.04)  K/mm3


 


RBC  3.98  (3.98-5.22)  M/mm3


 


Hgb  11.4  (11.2-15.7)  gm/L


 


Hct  35.7  (34.1-44.9)  %


 


MCV  89.7  (79.4-94.8)  fl


 


MCH  28.6  (25.6-32.2)  pg


 


MCHC  31.9 L  (32.2-35.5)  g/dl


 


RDW Std Deviation  43.6  (36.4-46.3)  fL


 


Plt Count  295  (182-369)  K/mm3


 


MPV  11.1  (9.4-12.3)  fl


 


Neut % (Auto)  67.2  (34.0-71.1)  %


 


Lymph % (Auto)  20.6  (19.3-51.7)  %


 


Mono % (Auto)  9.5  (4.7-12.5)  %


 


Eos % (Auto)  2.3  (0.7-5.8)  


 


Baso % (Auto)  0.2  (0.1-1.2)  %


 


Neut # (Auto)  5.50  (1.56-6.13)  K/mm3


 


Lymph # (Auto)  1.69  (1.18-3.74)  K/mm3


 


Mono # (Auto)  0.78 H  (0.24-0.36)  K/mm3


 


Eos # (Auto)  0.19  (0.04-0.36)  K/mm3


 


Baso # (Auto)  0.02  (0.01-0.08)  K/mm3


 


Neutrophils % (Manual)   (40-60)  %


 


Band Neutrophils %   (0-10)  %


 


Lymphocytes % (Manual)   (20-40)  %


 


Atypical Lymphs %   %


 


Monocytes % (Manual)   (2-10)  %


 


Eosinophils % (Manual)   (0.7-5.8)  %


 


Basophils % (Manual)   (0.1-1.2)  


 


Platelet Estimate   


 


RBC Morph Comment   


 


PT   (8.0-13.0)  SECONDS


 


INR   


 


APTT   (22-36)  SECONDS


 


D-Dimer, Quantitative   (0.19-0.59)  mg/L


 


Sodium   (136-145)  mEq/L


 


Potassium   (3.5-5.1)  mEq/L


 


Chloride   ()  mEq/L


 


Carbon Dioxide   (21-32)  mEq/L


 


Anion Gap   (5-15)  


 


BUN   (7-18)  mg/dL


 


Creatinine   (0.55-1.02)  mg/dL


 


Est Cr Clr Drug Dosing   mL/min


 


Estimated GFR (MDRD)   (>60)  mL/min


 


BUN/Creatinine Ratio   (14-18)  


 


Glucose   ()  mg/dL


 


Hemoglobin A1c   (4.50-6.20)  %


 


Calcium   (8.5-10.1)  mg/dL


 


Magnesium   (1.8-2.4)  mg/dl


 


Total Bilirubin   (0.2-1.0)  mg/dL


 


AST   (15-37)  U/L


 


ALT   (14-59)  U/L


 


Alkaline Phosphatase   ()  U/L


 


Troponin I   (0.00-0.056)  ng/mL


 


NT-Pro-B Natriuret Pep   (0-125)  pg/mL


 


Total Protein   (6.4-8.2)  g/dl


 


Albumin   (3.4-5.0)  g/dl


 


Globulin   gm/dL


 


Albumin/Globulin Ratio   (1-2)  


 


Free T4   (0.76-1.46)  ng/dL


 


TSH 3rd Generation   (0.358-3.74)  uIU/mL


 


Urine Color   (Yellow)  


 


Urine Appearance   (Clear)  


 


Urine pH   (5.0-8.0)  


 


Ur Specific Gravity   (1.005-1.030)  


 


Urine Protein   (Negative)  


 


Urine Glucose (UA)   (Negative)  


 


Urine Ketones   (Negative)  


 


Urine Occult Blood   (Negative)  


 


Urine Nitrite   (Negative)  


 


Urine Bilirubin   (Negative)  


 


Urine Urobilinogen   (0.2-1.0)  


 


Ur Leukocyte Esterase   (Negative)  


 


Urine RBC   (0-5)  /hpf


 


Urine WBC   (0-5)  /hpf


 


Ur Epithelial Cells   (0-5)  /hpf


 


Urine Bacteria   (FEW)  /hpf


 


Urine Mucus   (FEW)  /hpf











Med Orders - Current: 


 Current Medications





Acetaminophen (Tylenol)  650 mg PO Q4H PRN


   PRN Reason: Pain (Mild 1-3)/fever


Acetaminophen (Tylenol)  487.5 mg PO BID [facility]


   Last Admin: 04/01/18 20:24 Dose:  487.5 mg


Hydrocodone Bitart/Acetaminophen (Norco 325-5 Mg)  1 tab PO Q4H PRN


   PRN Reason: Pain (moderate 4-6)


Albuterol/Ipratropium (Duoneb 3.0-0.5 Mg/3 Ml)  3 ml NEB Q4H PRN


   PRN Reason: Shortness Of Breath/wheezing


Apixaban (Eliquis)  5 mg PO BID [facility]


   Last Admin: 04/01/18 20:25 Dose:  5 mg


Bisacodyl (Dulcolax)  5 mg PO DAILY PRN


   PRN Reason: Constipation


Docusate Sodium (Colace)  100 mg PO BID PRN


   PRN Reason: Constipation


Hydralazine HCl (Apresoline)  20 mg IVPUSH Q4H PRN


   PRN Reason: Hypertension


Diltiazem HCl 125 mg/ Sodium (Chloride)  125 mls @ 5 mls/hr IV TITRATE [facility]; 

Protocol


   Last Titration: 04/01/18 20:25 Dose:  0 mg/hr, 0 mls/hr


Promethazine HCl 12.5 mg/ (Sodium Chloride)  50.5 mls @ 100 mls/hr IV Q6H PRN


   PRN Reason: Nausea/Vomiting


Levothyroxine Sodium (Levothyroxine)  150 mcg PO ACBRK [facility]


   Last Admin: 04/02/18 06:22 Dose:  150 mcg


Lorazepam (Ativan)  1 mg IV Q6H PRN


   PRN Reason: Anxiety


Metoprolol Tartrate (Lopressor)  5 mg IVPUSH Q4H PRN


   PRN Reason: Tachycardia


Metoprolol Tartrate (Lopressor)  50 mg PO Q12HR [facility]


   Last Admin: 04/01/18 20:25 Dose:  50 mg


Non-Formulary Medication (Escitalopram Oxalate)  20 mg PO DAILY [facility]


Non-Formulary Medication (Lisinopril/Hydrochlorothiazide [Lisinopril-Hctz 20-

12.5 Mg Tab])  1 tab PO BID [facility]


   Last Admin: 04/01/18 20:27 Dose:  Not Given


Ondansetron HCl (Zofran)  4 mg IV Q6H PRN


   PRN Reason: Nausea/Vomiting


Polyethylene Glycol (Miralax)  17 gm PO DAILY PRN


   PRN Reason: Constipation


Potassium Chloride (Pharmacy To Dose - Potassium Replacement)  1 dose .XX 

ASDIRECTED [facility]


Senna/Docusate Sodium (Senna Plus)  1 tab PO BID PRN


   PRN Reason: Constipation


Sodium Chloride (Saline Flush)  10 ml FLUSH ASDIRECTED PRN


   PRN Reason: Keep Vein Open


   Last Admin: 04/01/18 12:57 Dose:  10 ml


Sodium Chloride (Saline Flush)  10 ml FLUSH ONETIME PRN


   PRN Reason: IV FLUSH


   Last Admin: 04/01/18 14:42 Dose:  10 ml


Temazepam (Restoril)  7.5 mg PO BEDTIME PRN


   PRN Reason: Sleep





Discontinued Medications





Aspirin (Aspirin)  324 mg PO ONETIME ONE


   Stop: 04/01/18 12:44


   Last Admin: 04/01/18 12:55 Dose:  324 mg


Diltiazem HCl (Diltiazem)  20 mg IVPUSH ONETIME ONE


   Stop: 04/01/18 12:44


   Last Admin: 04/01/18 12:52 Dose:  20 mg


Diltiazem HCl (Diltiazem)  20 mg IVPUSH ONETIME ONE


   Stop: 04/01/18 13:17


   Last Admin: 04/01/18 13:31 Dose:  20 mg


Diltiazem HCl (Cardizem Cd)  180 mg PO ONETIME ONE


   Stop: 04/01/18 13:20


   Last Admin: 04/01/18 13:33 Dose:  180 mg


Diltiazem HCl (Diltiazem)  10 mg IVPUSH ONETIME ONE


   Stop: 04/01/18 15:12


   Last Admin: 04/01/18 15:36 Dose:  10 mg


Magnesium Sulfate 2 gm/ Premix  50 mls @ 25 mls/hr IV ONETIME ONE


   Stop: 04/01/18 14:44


   Last Admin: 04/01/18 12:56 Dose:  25 mls/hr


Sodium Chloride (Normal Saline)  1,000 mls @ 250 mls/hr IV ASDIRECTED [facility]


   Last Admin: 04/01/18 12:58 Dose:  250 mls/hr


Sodium Chloride (Normal Saline)  250 mls @ 80 mls/min IV ASDIRECTED [facility]


   Last Admin: 04/01/18 14:51 Dose:  80 mls/min


Sodium Chloride (Normal Saline)  500 mls @ 999 mls/hr IV .BOLUS ONE


   Stop: 04/02/18 02:46


   Last Admin: 04/02/18 02:30 Dose:  999 mls/hr


Sodium Chloride (Normal Saline) Confirm Administered Dose 500 mls @ as directed 

.ROUTE .STK-MED ONE


   Stop: 04/02/18 02:19


   Last Admin: 04/02/18 02:28 Dose:  Not Given


Iopamidol (Isovue-370 (76%))  100 ml IVPUSH ONETIME ONE


   Stop: 04/01/18 14:19


   Last Admin: 04/01/18 14:42 Dose:  100 ml


Iopamidol (Isovue-370 (76%))  50 ml IVPUSH ONETIME ONE


   Stop: 04/01/18 14:19


   Last Admin: 04/01/18 14:42 Dose:  50 ml


Levothyroxine Sodium (Levothyroxine)  175 mcg PO ACBRK [facility]


Potassium Chloride (Klor-Con M20)  40 meq PO Q4H [facility]


   Stop: 04/01/18 21:01


   Last Admin: 04/01/18 20:24 Dose:  40 meq











- Exam


Quality Assessment: Supplemental Oxygen


General: Alert, Oriented, Cooperative, No Acute Distress, Other (Morbid Obesity)


HEENT: Pupils Equal, Pupils Reactive, EOMI, Mucous Membr. Moist/Pink


Neck: Supple, Trachea Midline, No JVD, No Thyromegaly, Other (short and thick)


Lungs: Clear to Auscultation, Normal Respiratory Effort


Cardiovascular: Regular Rate, Regular Rhythm


GI/Abdominal Exam: Normal Bowel Sounds, Soft, Non-Tender, No Organomegaly, No 

Distention, No Abnormal Bruit, No Mass


 (Female) Exam: Deferred


Back Exam: Normal Inspection, Decreased Range of Motion


Extremities: Normal Inspection, Normal Range of Motion (right lower extremity), 

Non-Tender, No Pedal Edema, Normal Capillary Refill, Limited Range of Motion (

left knee), Other (trace bilateral lower extremity edema)


Peripheral Pulses: 2+: Dorsalis Pedis (L), Dorsalis Pedis (R)


Skin: Warm, Dry, Intact


Neurological: No New Focal Deficit.  No: Normal Gait


Psy/Mental Status: Alert, Normal Affect, Normal Mood.  No: Anxious





- Problem List Review


Problem List Initiated/Reviewed/Updated: Yes





- My Orders


Last 24 Hours: 


My Active Orders





04/01/18 15:32


Metoprolol Tartrate [Lopressor]   5 mg IVPUSH Q4H PRN 


hydrALAZINE [Apresoline]   20 mg IVPUSH Q4H PRN 





04/01/18 15:33


Ambulate [RC] ASDIRECTED 


Cardiac Monitoring [RC] CONTINUOUS 


Height and Weight [RC] 04 


Intake and Output [RC] 04,16 


Up ad Nuris [RC] ASDIRECTED 


VTE/DVT Education [RC] 10,22 


Acetaminophen [Tylenol]   650 mg PO Q4H PRN 


Acetaminophen/HYDROcodone [Norco 325-5 MG]   1 tab PO Q4H PRN 


Albuterol/Ipratropium [DuoNeb 3.0-0.5 MG/3 ML]   3 ml NEB Q4H PRN 


Bisacodyl [Dulcolax]   5 mg PO DAILY PRN 


Docusate Sodium [Colace]   100 mg PO BID PRN 


Docusate Sodium/Sennosides [Senna Plus]   1 tab PO BID PRN 


LORazepam [Ativan]   1 mg IV Q6H PRN 


Ondansetron [Zofran]   4 mg IV Q6H PRN 


Polyethylene Glycol 3350 [MiraLAX]   17 gm PO DAILY PRN 


Promethazine [Phenergan] 12.5 mg   Sodium Chloride 0.9% [Normal Saline] 50 ml 

IV Q6H 


Temazepam [Restoril]   7.5 mg PO BEDTIME PRN 


Resuscitation Status Routine 





04/01/18 15:34


RT Aerosol Therapy [RC] ASDIRECTED 


Consult to Case Management [CONS] Routine 


Consult to Dietician [CONS] Routine 


Consult to  [CONS] Routine 





04/01/18 15:45


Potassium Rep Pharmacy to Dose [Pharmacy to Dose - Potassium Replacement]   1 

dose .XX ASDIRECTED 





04/01/18 21:00


Acetaminophen [Tylenol]   487.5 mg PO BID 


Apixaban [Eliquis]   5 mg PO BID 


Lisinopril/Hydrochlorothiazide [Lisinopril-Hctz 20-12.5 mg Tab]   1 tab PO BID 


Metoprolol Tartrate [Lopressor]   50 mg PO Q12HR 





04/01/18 Dinner


Heart Healthy Diet [DIET] 





04/02/18 03:17


EKG Documentation Completion [RC] ASDIRECTED 


EKG 12 Lead [EK] Routine 





04/02/18 05:25


BASIC METABOLIC PANEL,BMP [CHEM] AM 


MAGNESIUM [CHEM] AM 





04/02/18 06:00


Levothyroxine   150 mcg PO ACBRK 





04/02/18 07:00


Echo Comp wo Cont [US] Routine 





04/02/18 09:00


Escitalopram Oxalate   20 mg PO DAILY 





04/03/18 05:11


BASIC METABOLIC PANEL,BMP [CHEM] AM 


MAGNESIUM [CHEM] AM 





04/04/18 05:11


BASIC METABOLIC PANEL,BMP [CHEM] AM 


MAGNESIUM [CHEM] AM 














- Plan


Plan:: 


Assessment/Plan:


Acute:


Atrial Fibrillation S/p RVR, HR controlled


   - New onset


   - Likely 2/2 thyroid toxicity + large amount of caffeine intake


   - TSH 0.318 and FT4 1.73; she admits to drinking a pitcher of caffeinated 

tea daily


   - She is now off Cardizem drip,; continue Metoprolol 50 mg po BID 


   - Will cut down her thyroid dose by 25 mg dose and advised to limit her 

intake of tea after discharge


   - 2D echo completed this AM


   - [id number] score is 3-->Moderate-High risk for stroke w/o anti-

coagulation


   - Discussed Traditional vs DOACs treatment to include risks and benefits; 

continue Eliquis 5 mg po BID





Relative Hypotension


   - She is asymptomatic and stable MAP


   - Received one time bolus of 500 ml of NS last night


   - 2/2 recent cardizem use


   - Hold home BP meds and continue to monitor





Chest CT can Abnormal Findings


   - Questionable partially visualized staghorn calculus within the left kidney

; she has no  symptoms


   - Offered inpatient imaging; refused it


   - Recommend CT abdomen w/o contrast outpatient   


   


Resolved:


S/p Elevated D-Dimer


   - D-dimer of 1.11


   - Chest CTA-negative for PE


   - Troponin x2 negative





S/p Hypomagnesemia


   - Mg 1.6--> 2.2


   - 2/2 inadequate intake


   - Replete and monitor





Chronic:


HTN


Hypothyroidism


OA/DJD


Knee Pain


Anxiety


Morbid Obesity with BMI of 50.9





Plan:


She is much better clinically


She remains in ICU until hypotension completely resolves


Continue current treatment


2D echo in AM for baseline


Dietary consult for weight management


Screen for Diabetes A1C: 5.6 (no diabetes)


Routine AM Labs


Continue PT/OT 


SW/CM for d/c planning


Code status: 1


Possible d/c in AM





Updated family about her clinical progress and discharge care plan tomorrow

## 2018-04-03 VITALS — SYSTOLIC BLOOD PRESSURE: 96 MMHG | DIASTOLIC BLOOD PRESSURE: 54 MMHG

## 2018-04-03 NOTE — [id number]
**Discharge Summary





- Hospital Course


Brief History: This is a 73 yo elderly white female with past medical hx/o HTN, 

OA/DJD, Hypothyroidism and Morbid Obesity who comes in to day for heart 

palpitations that has been going on for a month now, on and off. She does not 

follow a cardiologist and [place] She is a retired nurse 

and according to her, if her heart palpitation comes on, she usually terminates 

on her own by hitting her chest and with abrupt short cough.  Upon presentation 

to ED, she was found with a HR in the 150s. Her initial work up in ED shows a 

CBC remarkable for WBC of 10.05, and Lymphocytes of 41%. Her coagulation 

studies show a PT of 10.7, INR of 1, APTT of 28 and D-Dimer of 1.11. Her 

chemistry is significant for AG of 17.8, BUN of 24, BS of 120, Mg of 1.6, 

ProBNP of 348, FT4 of 1.73 and TSH of 0.318. Her UA is negative for UTI. Her 

CXR and Chest CT scan both show no acute abnormal findings.  Patient is being 

admitted for medical management of atrial fibrillation with rvr.  





- Discharge Data


Discharge Date: 04/03/18


Discharge Disposition: Home, Self-Care 01


Condition: Good





- Discharge Diagnosis/Problem(s)


(1) New onset atrial fibrillation


SNOMED Code(s): 72049463


   ICD Code: I48.91 - UNSPECIFIED ATRIAL FIBRILLATION   Status: Resolved   





(2) Hypotension due to medication


Status: Acute   





(3) Renal calculi


SNOMED Code(s): 92303242


   ICD Code: N20.0 - CALCULUS OF KIDNEY   Status: Acute   





(4) D-dimer, elevated


SNOMED Code(s): 221131868


   ICD Code: R79.89 - OTHER SPECIFIED ABNORMAL FINDINGS OF BLOOD CHEMISTRY   

Status: Acute   





(5) Hypomagnesemia syndrome


SNOMED Code(s): 832278659


   ICD Code: E83.42 - HYPOMAGNESEMIA   Status: Acute   





(6) Thyroid toxicity, exogenous


SNOMED Code(s): 6161145


   ICD Code: E05.80 - OTHER THYROTOXICOSIS WITHOUT THYROTOXIC CRISIS OR STORM   

Status: Acute   





(7) Caffeine adverse reaction


SNOMED Code(s): 403810339


   ICD Code: T43.615A - ADVERSE EFFECT OF CAFFEINE, INITIAL ENCOUNTER   Status: 

Acute   


Qualifiers: 


   Encounter type: initial encounter   Qualified Code(s): T43.615A - Adverse 

effect of caffeine, initial encounter   





- Patient Summary/Data


Operative Procedure(s) Performed: None


Complications: None


Consults: 


 Consultations





04/01/18 15:34


Consult to Case Management [CONS] Routine 


Consult to Dietician [CONS] Routine 


Consult to  [CONS] Routine 





04/02/18 08:42


Consult to Dietary [Consult to Dietician] [CONS] Routine 











Labs Pending at D/C: 


None





Recommended Follow-up Testing/Procedures: 


None





Planned Operative Procedure(s) after DC: None


Hospital Course: 


Patient was primarily admitted for medical management of new onset of atrial 

fibrillation with RVR. She carried no previous hx/o it in the past. However she 

had predisposing factors such as HTN, DM2, Hypothyroidism and Morbid Obesity.





We felt her atrial fibrillation was due to thyroid toxicity +/- excessive 

caffein intake. This finding was supported by considerably reduced TSH level on 

thyroid panel. 





Upon presentation to ED, she was found with heart rates in the 140s. She 

received initial care before she was sent to the unit for further treatment. In 

the unit, she was managed medically with cardizem gtt but was immediate 

switched to oral beta blocker regimen once her rate was fully controlled. 

Patient elected to be put on eliquis for stroke prophylaxis.





Her hospital course was uncomplicated and the rest of her chronic medical 

illness remained stable during this admission.





Patient was stable upon discharge. She was advised to call or follow up with 

her PCP for any questions or concerns after discharge. She was further advised 

to come back or seek immediate care should her symptoms persist or get worse.








- Patient Instructions


Diet: Heart Healthy Diet, Usual Diet as Tolerated, Weight Loss Diet


Activity: As Tolerated


Driving: May Drive Today


Showering/Bathing: May Shower


Notify Provider of: Fever, Increased Pain, Nausea and/or Vomiting


Other/Special Instructions: - Please take all new medications as directed.  - 

Resume Routine Home Medications except for the changes as noted/discussed over.

  - Check your vitals at least 3x/day and show log on follow up appointment 

with your PCP.  - Recommend lifestyle modifications.  - Recommend outpatient CT 

scan for futher details of your suspected renal calculi.  - Call your family 

doctor for any questions or concerns after discharge.  - Follow up with PCP in 1

-2 week.  - Come back or seek immediate care should your symptoms persist or 

get worse





- Discharge Plan


Prescriptions/Med Rec: 


Apixaban [Eliquis] 5 mg PO BID #60 tablet


Levothyroxine 150 mcg PO ACBRK #30 tablet


Metoprolol Tartrate 25 mg PO BID #60 tablet


Home Medications: 


 Home Meds





Escitalopram Oxalate [Lexapro] 20 mg PO DAILY 07/04/16 [History]


Acetaminophen [Tylenol Extra Strength] 500 mg PO BID 04/01/18 [History]


Apixaban [Eliquis] 5 mg PO BID #60 tablet 04/03/18 [Rx]


Levothyroxine 150 mcg PO ACBRK #30 tablet 04/03/18 [Rx]


Lisinopril/Hydrochlorothiazide [Lisinopril-Hctz 20-12.5 mg Tab] 1 tab PO BID #0 

04/03/18 [Rx]


Metoprolol Tartrate 25 mg PO BID #60 tablet 04/03/18 [Rx]








Patient Handouts:  Hyperthyroidism, Hypomagnesemia, Apixaban oral tablets, 

Atrial Fibrillation, Easy-to-Read





- Discharge Summary/Plan Comment


DC Time >30 min.: Yes (45 mins)


Discharge Summary/Plan Comment: 


Discharge to Home








- General Info


Date of Service: 04/03/18


Admission Dx/Problem (Free Text: 


New Onset of Atrial Fibrillation with RVR





Subjective Update: 


Follow Up





Functional Status: Reports: Pain Controlled, Tolerating Diet, Ambulating, 

Urinating.  Denies: New Symptoms





- Review of Systems


General: Denies: Fever, Weakness, Fatigue, Malaise, Chills


HEENT: Reports: No Symptoms


Pulmonary: Denies: Shortness of Breath, Pleuritic Chest Pain, Cough


Cardiovascular: Denies: Chest Pain, Palpitations, Dyspnea on Exertion, Edema, 

Lightheadedness


Gastrointestinal: Denies: Abdominal Pain, Nausea, Vomiting


Genitourinary: Reports: No Symptoms


Musculoskeletal: Reports: No Symptoms, Joint Pain


Skin: Denies: Cyanosis, Jaundice, Mottled, Pallor, Diaphoresis, Bruising, 

Pruritis


Neurological: Reports: Gait Disturbance.  Denies: Confusion, Pre-Existing 

Deficit, Difficulty Walking, Weakness


Psychiatric: Denies: Depression, Anxiety, Agitation, Hallucinations, Suicidal 

Ideation


Systems Review Comment: 


No overnight or acute issues. She is doping relatively well. She denies any new 

complaints.








- Patient Data


Vitals - Most Recent: 


 Last Vital Signs











Temp  36.5 C   04/03/18 04:00


 


Pulse  65   04/03/18 09:00


 


Resp  19   04/03/18 09:00


 


BP  96/54 L  04/03/18 09:00


 


Pulse Ox  96   04/03/18 09:00











Weight - Most Recent: 142.836 kg


I&O - Last 24 hours: 


 Intake & Output











 04/02/18 04/03/18 04/03/18





 22:59 06:59 14:59


 


Intake Total 1040 400 120


 


Output Total 350 600 


 


Balance 690 -200 120











Lab Results - Last 24 hrs: 


 Laboratory Results - last 24 hr











  04/03/18 Range/Units





  05:46 


 


Sodium  140  (136-145)  mEq/L


 


Potassium  4.6  (3.5-5.1)  mEq/L


 


Chloride  105  ()  mEq/L


 


Carbon Dioxide  28  (21-32)  mEq/L


 


Anion Gap  11.6  (5-15)  


 


BUN  21 H  (7-18)  mg/dL


 


Creatinine  0.8  (0.55-1.02)  mg/dL


 


Est Cr Clr Drug Dosing  58.88  mL/min


 


Estimated GFR (MDRD)  > 60  (>60)  mL/min


 


BUN/Creatinine Ratio  26.3 H  (14-18)  


 


Glucose  113  ()  mg/dL


 


Calcium  8.9  (8.5-10.1)  mg/dL


 


Magnesium  1.9  (1.8-2.4)  mg/dl











Med Orders - Current: 


 Current Medications





Acetaminophen (Tylenol)  650 mg PO Q4H PRN


   PRN Reason: Pain (Mild 1-3)/fever


Acetaminophen (Tylenol)  487.5 mg PO BID [facility]


   Last Admin: 04/03/18 09:21 Dose:  487.5 mg


Hydrocodone Bitart/Acetaminophen (Norco 325-5 Mg)  1 tab PO Q4H PRN


   PRN Reason: Pain (moderate 4-6)


Albuterol/Ipratropium (Duoneb 3.0-0.5 Mg/3 Ml)  3 ml NEB Q4H PRN


   PRN Reason: Shortness Of Breath/wheezing


Apixaban (Eliquis)  5 mg PO BID [facility]


   Last Admin: 04/03/18 09:19 Dose:  5 mg


Bisacodyl (Dulcolax)  5 mg PO DAILY PRN


   PRN Reason: Constipation


Docusate Sodium (Colace)  100 mg PO BID PRN


   PRN Reason: Constipation


Hydralazine HCl (Apresoline)  20 mg IVPUSH Q4H PRN


   PRN Reason: Hypertension


Hydrochlorothiazide (Hydrochlorothiazide)  12.5 mg PO BID [facility]


   Last Admin: 04/03/18 09:21 Dose:  Not Given


Diltiazem HCl 125 mg/ Sodium (Chloride)  125 mls @ 5 mls/hr IV TITRATE [facility]; 

Protocol


   Last Titration: 04/01/18 20:25 Dose:  0 mg/hr, 0 mls/hr


Promethazine HCl 12.5 mg/ (Sodium Chloride)  50.5 mls @ 100 mls/hr IV Q6H PRN


   PRN Reason: Nausea/Vomiting


Levothyroxine Sodium (Levothyroxine)  150 mcg PO ACBRK [facility]


   Last Admin: 04/03/18 05:00 Dose:  150 mcg


Lisinopril (Prinivil)  20 mg PO BID [facility]


   Last Admin: 04/03/18 09:20 Dose:  Not Given


Lorazepam (Ativan)  1 mg IV Q6H PRN


   PRN Reason: Anxiety


Metoprolol Tartrate (Lopressor)  5 mg IVPUSH Q4H PRN


   PRN Reason: Tachycardia


Metoprolol Tartrate (Lopressor)  50 mg PO Q12HR [facility]


   Last Admin: 04/03/18 09:21 Dose:  Not Given


Ondansetron HCl (Zofran)  4 mg IV Q6H PRN


   PRN Reason: Nausea/Vomiting


Escitalopram 20 Mg (Tab)  20 each PO DAILY [facility]


   Last Admin: 04/03/18 09:24 Dose:  20 each


Polyethylene Glycol (Miralax)  17 gm PO DAILY PRN


   PRN Reason: Constipation


Potassium Chloride (Pharmacy To Dose - Potassium Replacement)  1 dose .XX 

ASDIRECTED [facility]


Senna/Docusate Sodium (Senna Plus)  1 tab PO BID PRN


   PRN Reason: Constipation


Sodium Chloride (Saline Flush)  10 ml FLUSH ASDIRECTED PRN


   PRN Reason: Keep Vein Open


   Last Admin: 04/01/18 12:57 Dose:  10 ml


Sodium Chloride (Saline Flush)  10 ml FLUSH ONETIME PRN


   PRN Reason: IV FLUSH


   Last Admin: 04/01/18 14:42 Dose:  10 ml


Temazepam (Restoril)  7.5 mg PO BEDTIME PRN


   PRN Reason: Sleep





Discontinued Medications





Aspirin (Aspirin)  324 mg PO ONETIME ONE


   Stop: 04/01/18 12:44


   Last Admin: 04/01/18 12:55 Dose:  324 mg


Citalopram Hydrobromide (Celexa)  40 mg PO DAILY [facility]


   Last Admin: 04/02/18 09:11 Dose:  Not Given


Diltiazem HCl (Diltiazem)  20 mg IVPUSH ONETIME ONE


   Stop: 04/01/18 12:44


   Last Admin: 04/01/18 12:52 Dose:  20 mg


Diltiazem HCl (Diltiazem)  20 mg IVPUSH ONETIME ONE


   Stop: 04/01/18 13:17


   Last Admin: 04/01/18 13:31 Dose:  20 mg


Diltiazem HCl (Cardizem Cd)  180 mg PO ONETIME ONE


   Stop: 04/01/18 13:20


   Last Admin: 04/01/18 13:33 Dose:  180 mg


Diltiazem HCl (Diltiazem)  10 mg IVPUSH ONETIME ONE


   Stop: 04/01/18 15:12


   Last Admin: 04/01/18 15:36 Dose:  10 mg


Magnesium Sulfate 2 gm/ Premix  50 mls @ 25 mls/hr IV ONETIME ONE


   Stop: 04/01/18 14:44


   Last Admin: 04/01/18 12:56 Dose:  25 mls/hr


Sodium Chloride (Normal Saline)  1,000 mls @ 250 mls/hr IV ASDIRECTED [facility]


   Last Admin: 04/01/18 12:58 Dose:  250 mls/hr


Sodium Chloride (Normal Saline)  250 mls @ 80 mls/min IV ASDIRECTED [facility]


   Last Admin: 04/01/18 14:51 Dose:  80 mls/min


Sodium Chloride (Normal Saline)  500 mls @ 999 mls/hr IV .BOLUS ONE


   Stop: 04/02/18 02:46


   Last Admin: 04/02/18 02:30 Dose:  999 mls/hr


Sodium Chloride (Normal Saline) Confirm Administered Dose 500 mls @ as directed 

.ROUTE .STK-MED ONE


   Stop: 04/02/18 02:19


   Last Admin: 04/02/18 02:28 Dose:  Not Given


Iopamidol (Isovue-370 (76%))  100 ml IVPUSH ONETIME ONE


   Stop: 04/01/18 14:19


   Last Admin: 04/01/18 14:42 Dose:  100 ml


Iopamidol (Isovue-370 (76%))  50 ml IVPUSH ONETIME ONE


   Stop: 04/01/18 14:19


   Last Admin: 04/01/18 14:42 Dose:  50 ml


Levothyroxine Sodium (Levothyroxine)  175 mcg PO ACBRK [facility]


Non-Formulary Medication (Lisinopril/Hydrochlorothiazide [Lisinopril-Hctz 20-

12.5 Mg Tab])  1 tab PO BID [facility]


   Last Admin: 04/01/18 20:27 Dose:  Not Given


Escitalopram 20 Mg (Tab)  20 each PO DAILY [facility]


Potassium Chloride (Klor-Con M20)  40 meq PO Q4H [facility]


   Stop: 04/01/18 21:01


   Last Admin: 04/01/18 20:24 Dose:  40 meq











- Exam


General: Reports: Alert, Oriented, Cooperative, No Acute Distress, Other (

Morbidly Obese)


HEENT: Reports: Pupils Equal, Pupils Reactive, EOMI, Mucous Membr. Moist/Pink


Neck: Reports: Supple, Trachea Midline, No JVD, No Thyromegaly, Other (short 

and thick)


Lungs: Reports: Clear to Auscultation, Normal Respiratory Effort


Cardiovascular: Reports: Regular Rate, Regular Rhythm


GI/Abdominal Exam: Normal Bowel Sounds, Soft, Non-Tender, No Organomegaly, No 

Distention, No Abnormal Bruit, No Mass


 (Female) Exam: Deferred


Rectal (Female) Exam: Deferred


Back Exam: Reports: Normal Inspection, Decreased Range of Motion


Extremities: Normal Inspection, Normal Range of Motion, Non-Tender, No Pedal 

Edema, Normal Capillary Refill


Skin: Reports: Warm, Dry, Intact.  Denies: Ecchymosis


Neurological: Reports: No New Focal Deficit.  Denies: Normal Gait


Psy/Mental Status: Reports: Alert, Normal Affect, Normal Mood

## 2023-01-27 NOTE — EDM.PDOC
ED HPI GENERAL MEDICAL PROBLEM





- General


Chief Complaint: Cardiovascular Problem


Stated Complaint: HEART PROBLEMS


Time Seen by Provider: 04/01/18 12:33


Source of Information: Reports: Patient


History Limitations: Reports: No Limitations





- History of Present Illness


INITIAL COMMENTS - FREE TEXT/NARRATIVE: 


Patient is a 71 y/o female who presents to the E.D. complaining of irregular 

fast heart beat. 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 

States she has been experiencing this for the past month. Episodes initially 

were short easily converting with holding her breath and thumping on her chest.

  Patient states as of recent the frequency of episodes and duration have 

increased. Yesterday the palpitations lasted almost all day. After eventually 

converting. States that today the palpitations are, about to not relieved with 

the above therapies. She's taken aspirin on intermittent basis. She is on no 

active anticoagulants. She has not seen her primary care provider for this. She 

does complain of some mild shortness of breath with exertion. No dizziness, no 

chest pain, no nausea no vomiting, no abdominal pain, no fever, no increased 

swelling to extremities, no PND, no orthopnea, no pain to the posterior aspect 

of the calves, no recent surgery, long distance travel, or history of DVT/PE. 

She has history of hypertension and is on lisinopril and HCTZ. Hypothyroidism 

on levothyroxine. Anxiety and depression on Lexapro. Patient does not smoke. 

She has no history of hypercholesteremia.


Treatments PTA: Reports: Other (see below)


Other Treatments PTA: none





- Related Data


 Allergies











Allergy/AdvReac Type Severity Reaction Status Date / Time


 


Penicillins Allergy  Hives Verified 04/01/18 17:26


 


hydromorphone HCl AdvReac  Syncope Verified 04/02/18 07:20





[From Dilaudid]     











Home Meds: 


 Home Meds





Escitalopram Oxalate [Lexapro] 20 mg PO DAILY 07/04/16 [History]


Levothyroxine 175 mcg PO ACBRK 07/04/16 [History]


Lisinopril/Hydrochlorothiazide [Lisinopril-Hctz 20-12.5 mg Tab] 1 tab PO BID 07/ 04/16 [History]


Acetaminophen [Tylenol Extra Strength] 500 mg PO BID 04/01/18 [History]











Past Medical History


Cardiovascular History: Reports: Hypertension


OB/GYN History: Reports: Pregnancy


Musculoskeletal History: Reports: Arthritis


Psychiatric History: Reports: Anxiety


Endocrine/Metabolic History: Reports: Hypothyroidism





- Infectious Disease History


Infectious Disease History: Reports: Chicken Pox, Measles, Mumps





- Past Surgical History


GI Surgical History: Reports: Appendectomy, Hernia, Abdominal, Other (See Below)


Other GI Surgeries/Procedures: peritonitis from ruptured appendix





Social & Family History





- Tobacco Use


Smoking Status *Q: Never Smoker





- Caffeine Use


Caffeine Use: Reports: Coffee, Soda, Tea





- Recreational Drug Use


Recreational Drug Use: No





ED ROS GENERAL





- Review of Systems


Review Of Systems: ROS reveals no pertinent complaints other than HPI.





ED EXAM, GENERAL





- Physical Exam


Exam: See Below


Exam Limited By: No Limitations


General Appearance: Alert, WD/WN, No Apparent Distress


Ears: Hearing Grossly Normal


Nose: Normal Inspection


Throat/Mouth: Normal Voice, No Airway Compromise


Neck: Normal Inspection, Supple


Respiratory/Chest: No Respiratory Distress, Lungs Clear, Normal Breath Sounds, 

No Accessory Muscle Use, Chest Non-Tender


Cardiovascular: Normal Peripheral Pulses, Tachycardia, Irregularly Irregular


Peripheral Pulses: 4+: Radial (L), Radial (R)


GI/Abdominal: Normal Bowel Sounds, Soft, Non-Tender, No Organomegaly, No 

Distention


Back Exam: Normal Inspection


Extremities: Normal Inspection, Non-Tender, Normal Capillary Refill


Neurological: Alert, Oriented, CN II-XII Intact, Normal Cognition, No Motor/

Sensory Deficits


Psychiatric: Normal Affect, Normal Mood


Skin Exam: Warm, Dry, Intact, Normal Color





Course





- Vital Signs


Last Recorded V/S: 


 Last Vital Signs











Temp  98 F   04/02/18 07:44


 


Pulse  64   04/02/18 09:13


 


Resp  22 H  04/02/18 07:44


 


BP  96/60   04/02/18 09:13


 


Pulse Ox  94 L  04/02/18 04:00














- Orders/Labs/Meds


Orders: 


 Active Orders 24 hr











 Category Date Time Status


 


 Ambulate [RC] ASDIRECTED Care  04/01/18 15:33 Active


 


 Cardiac Monitoring [RC] CONTINUOUS Care  04/01/18 15:33 Active


 


 Height and Weight [RC] 04 Care  04/01/18 15:33 Active


 


 Intake and Output [RC] 04,16 Care  04/01/18 15:33 Active


 


 Oxygen Therapy [RC] .PRN Care  04/01/18 12:25 Active


 


 RT Aerosol Therapy [RC] ASDIRECTED Care  04/01/18 15:34 Active


 


 Up ad Nuris [RC] ASDIRECTED Care  04/01/18 15:33 Active


 


 VTE/DVT Education [RC] 10,22 Care  04/01/18 15:33 Active


 


 Consult to Case Management [CONS] Routine Cons  04/01/18 15:34 Active


 


 Consult to Dietician [CONS] Routine Cons  04/01/18 15:34 Active


 


 Consult to  [CONS] Routine Cons  04/01/18 15:34 Active


 


 Heart Healthy Diet [DIET] Diet  04/01/18 Dinner Active


 


 BASIC METABOLIC PANEL,BMP [CHEM] AM Lab  04/03/18 05:11 Ordered


 


 BASIC METABOLIC PANEL,BMP [CHEM] AM Lab  04/04/18 05:11 Ordered


 


 DD [D-DIMER QUANTITATIVE] [COAG] Stat Lab  04/01/18 12:50 Ordered


 


 MAGNESIUM [CHEM] AM Lab  04/03/18 05:11 Ordered


 


 MAGNESIUM [CHEM] AM Lab  04/04/18 05:11 Ordered


 


 Acetaminophen [Tylenol] Med  04/01/18 15:33 Active





 650 mg PO Q4H PRN   


 


 Acetaminophen/HYDROcodone [Norco 325-5 MG] Med  04/01/18 15:33 Active





 1 tab PO Q4H PRN   


 


 Albuterol/Ipratropium [DuoNeb 3.0-0.5 MG/3 ML] Med  04/01/18 15:33 Active





 3 ml NEB Q4H PRN   


 


 Bisacodyl [Dulcolax] Med  04/01/18 15:33 Active





 5 mg PO DAILY PRN   


 


 Citalopram [Celexa] Med  04/02/18 09:00 Active





 40 mg PO DAILY   


 


 Diltiazem 125 mg Med  04/01/18 15:15 Active





 Sodium Chloride 0.9% [Normal Saline] 100 ml   





 IV TITRATE   


 


 Docusate Sodium [Colace] Med  04/01/18 15:33 Active





 100 mg PO BID PRN   


 


 Docusate Sodium/Sennosides [Senna Plus] Med  04/01/18 15:33 Active





 1 tab PO BID PRN   


 


 LORazepam [Ativan] Med  04/01/18 15:33 Active





 1 mg IV Q6H PRN   


 


 Metoprolol Tartrate [Lopressor] Med  04/01/18 15:32 Active





 5 mg IVPUSH Q4H PRN   


 


 Metoprolol Tartrate [Lopressor] Med  04/01/18 21:00 Active





 50 mg PO Q12HR   


 


 Ondansetron [Zofran] Med  04/01/18 15:33 Active





 4 mg IV Q6H PRN   


 


 Polyethylene Glycol 3350 [MiraLAX] Med  04/01/18 15:33 Active





 17 gm PO DAILY PRN   


 


 Potassium Rep Pharmacy to Dose [Pharmacy to Dose - Med  04/01/18 15:45 Active





 Potassium Replacement]   





 1 dose .XX ASDIRECTED   


 


 Promethazine [Phenergan] 12.5 mg Med  04/01/18 15:33 Active





 Sodium Chloride 0.9% [Normal Saline] 50 ml   





 IV Q6H   


 


 Sodium Chloride 0.9% [Saline Flush] Med  04/01/18 12:26 Active





 10 ml FLUSH ASDIRECTED PRN   


 


 Sodium Chloride 0.9% [Saline Flush] Med  04/01/18 14:18 Active





 10 ml FLUSH ONETIME PRN   


 


 Temazepam [Restoril] Med  04/01/18 15:33 Active





 7.5 mg PO BEDTIME PRN   


 


 hydrALAZINE [Apresoline] Med  04/01/18 15:32 Active





 20 mg IVPUSH Q4H PRN   


 


 Peripheral IV Insertion Adult [OM.PC] Routine Oth  04/01/18 12:26 Ordered


 


 Resuscitation Status Routine Resus Stat  04/01/18 15:33 Ordered








 Medication Orders





Acetaminophen (Tylenol)  650 mg PO Q4H PRN


   PRN Reason: Pain (Mild 1-3)/fever


Acetaminophen (Tylenol)  487.5 mg PO BID [facility]


   Last Admin: 04/02/18 09:14  Dose: 487.5 mg


   Admin: 04/01/18 20:24  Dose: 487.5 mg


Hydrocodone Bitart/Acetaminophen (Norco 325-5 Mg)  1 tab PO Q4H PRN


   PRN Reason: Pain (moderate 4-6)


Albuterol/Ipratropium (Duoneb 3.0-0.5 Mg/3 Ml)  3 ml NEB Q4H PRN


   PRN Reason: Shortness Of Breath/wheezing


Apixaban (Eliquis)  5 mg PO BID [facility]


   Last Admin: 04/02/18 09:11  Dose: 5 mg


   Admin: 04/01/18 20:25  Dose: 5 mg


Bisacodyl (Dulcolax)  5 mg PO DAILY PRN


   PRN Reason: Constipation


Citalopram Hydrobromide (Celexa)  40 mg PO DAILY [facility]


   Last Admin: 04/02/18 09:11 Dose:  Not Given


Docusate Sodium (Colace)  100 mg PO BID PRN


   PRN Reason: Constipation


Hydralazine HCl (Apresoline)  20 mg IVPUSH Q4H PRN


   PRN Reason: Hypertension


Hydrochlorothiazide (Hydrochlorothiazide)  12.5 mg PO BID [facility]


   Last Admin: 04/02/18 09:13  Dose:  


Diltiazem HCl 125 mg/ Sodium (Chloride)  125 mls @ 5 mls/hr IV TITRATE [facility]; 

Protocol


   Last Titration: 04/01/18 20:25  Dose: 0 mg/hr, 0 mls/hr


   Titration: 04/01/18 20:14  Dose: 3 mg/hr, 3 mls/hr


   Titration: 04/01/18 18:47  Dose: 5 mg/hr, 5 mls/hr


   Titration: 04/01/18 18:02  Dose: 3 mg/hr, 3 mls/hr


   Admin: 04/01/18 15:54  Dose: 5 mg/hr, 5 mls/hr


Promethazine HCl 12.5 mg/ (Sodium Chloride)  50.5 mls @ 100 mls/hr IV Q6H PRN


   PRN Reason: Nausea/Vomiting


Levothyroxine Sodium (Levothyroxine)  150 mcg PO ACBRK [facility]


   Last Admin: 04/02/18 06:22  Dose: 150 mcg


Lisinopril (Prinivil)  20 mg PO BID [facility]


   Last Admin: 04/02/18 09:16  Dose:  


Lorazepam (Ativan)  1 mg IV Q6H PRN


   PRN Reason: Anxiety


Metoprolol Tartrate (Lopressor)  5 mg IVPUSH Q4H PRN


   PRN Reason: Tachycardia


Metoprolol Tartrate (Lopressor)  50 mg PO Q12HR [facility]


   Last Admin: 04/02/18 09:13  Dose: 50 mg


   Admin: 04/01/18 20:25  Dose: 50 mg


Ondansetron HCl (Zofran)  4 mg IV Q6H PRN


   PRN Reason: Nausea/Vomiting


Polyethylene Glycol (Miralax)  17 gm PO DAILY PRN


   PRN Reason: Constipation


Potassium Chloride (Pharmacy To Dose - Potassium Replacement)  1 dose .XX 

ASDIRECTED [facility]


Senna/Docusate Sodium (Senna Plus)  1 tab PO BID PRN


   PRN Reason: Constipation


Sodium Chloride (Saline Flush)  10 ml FLUSH ASDIRECTED PRN


   PRN Reason: Keep Vein Open


   Last Admin: 04/01/18 12:57  Dose: 10 ml


Sodium Chloride (Saline Flush)  10 ml FLUSH ONETIME PRN


   PRN Reason: IV FLUSH


   Last Admin: 04/01/18 14:42  Dose: 10 ml


Temazepam (Restoril)  7.5 mg PO BEDTIME PRN


   PRN Reason: Sleep








Labs: 


 Laboratory Tests











  04/01/18 04/01/18 04/01/18 Range/Units





  12:24 12:24 12:24 


 


WBC  10.05 H    (3.98-10.04)  K/mm3


 


RBC  4.32    (3.98-5.22)  M/mm3


 


Hgb  12.4    (11.2-15.7)  gm/L


 


Hct  37.8    (34.1-44.9)  %


 


MCV  87.5    (79.4-94.8)  fl


 


MCH  28.7    (25.6-32.2)  pg


 


MCHC  32.8    (32.2-35.5)  g/dl


 


RDW Std Deviation  41.7    (36.4-46.3)  fL


 


Plt Count  296    (182-369)  K/mm3


 


MPV  10.7    (9.4-12.3)  fl


 


Neutrophils % (Manual)  52    (40-60)  %


 


Band Neutrophils %  0    (0-10)  %


 


Lymphocytes % (Manual)  41 H    (20-40)  %


 


Atypical Lymphs %  0    %


 


Monocytes % (Manual)  4    (2-10)  %


 


Eosinophils % (Manual)  3    (0.7-5.8)  %


 


Basophils % (Manual)  0 L    (0.1-1.2)  


 


Platelet Estimate  Adequate    


 


RBC Morph Comment  Normal    


 


PT     (8.0-13.0)  SECONDS


 


INR     


 


APTT     (22-36)  SECONDS


 


D-Dimer, Quantitative     (0.19-0.59)  mg/L


 


Sodium   139   (136-145)  mEq/L


 


Potassium   3.8   (3.5-5.1)  mEq/L


 


Chloride   101   ()  mEq/L


 


Carbon Dioxide   24   (21-32)  mEq/L


 


Anion Gap   17.8 H   (5-15)  


 


BUN   24 H   (7-18)  mg/dL


 


Creatinine   0.9   (0.55-1.02)  mg/dL


 


Est Cr Clr Drug Dosing   52.34   mL/min


 


Estimated GFR (MDRD)   > 60   (>60)  mL/min


 


BUN/Creatinine Ratio   26.7 H   (14-18)  


 


Glucose   120 H   ()  mg/dL


 


Hemoglobin A1c     (4.50-6.20)  %


 


Calcium   9.0   (8.5-10.1)  mg/dL


 


Magnesium   1.6 L   (1.8-2.4)  mg/dl


 


Total Bilirubin   0.4   (0.2-1.0)  mg/dL


 


AST   15   (15-37)  U/L


 


ALT   19   (14-59)  U/L


 


Alkaline Phosphatase   73   ()  U/L


 


Troponin I   < 0.017   (0.00-0.056)  ng/mL


 


NT-Pro-B Natriuret Pep    348 H  (0-125)  pg/mL


 


Total Protein   7.5   (6.4-8.2)  g/dl


 


Albumin   3.4   (3.4-5.0)  g/dl


 


Globulin   4.1   gm/dL


 


Albumin/Globulin Ratio   0.8 L   (1-2)  


 


Free T4     (0.76-1.46)  ng/dL


 


TSH 3rd Generation   0.318 L   (0.358-3.74)  uIU/mL


 


Urine Color     (Yellow)  


 


Urine Appearance     (Clear)  


 


Urine pH     (5.0-8.0)  


 


Ur Specific Gravity     (1.005-1.030)  


 


Urine Protein     (Negative)  


 


Urine Glucose (UA)     (Negative)  


 


Urine Ketones     (Negative)  


 


Urine Occult Blood     (Negative)  


 


Urine Nitrite     (Negative)  


 


Urine Bilirubin     (Negative)  


 


Urine Urobilinogen     (0.2-1.0)  


 


Ur Leukocyte Esterase     (Negative)  


 


Urine RBC     (0-5)  /hpf


 


Urine WBC     (0-5)  /hpf


 


Ur Epithelial Cells     (0-5)  /hpf


 


Urine Bacteria     (FEW)  /hpf


 


Urine Mucus     (FEW)  /hpf














  04/01/18 04/01/18 04/01/18 Range/Units





  12:24 12:50 12:50 


 


WBC     (3.98-10.04)  K/mm3


 


RBC     (3.98-5.22)  M/mm3


 


Hgb     (11.2-15.7)  gm/L


 


Hct     (34.1-44.9)  %


 


MCV     (79.4-94.8)  fl


 


MCH     (25.6-32.2)  pg


 


MCHC     (32.2-35.5)  g/dl


 


RDW Std Deviation     (36.4-46.3)  fL


 


Plt Count     (182-369)  K/mm3


 


MPV     (9.4-12.3)  fl


 


Neutrophils % (Manual)     (40-60)  %


 


Band Neutrophils %     (0-10)  %


 


Lymphocytes % (Manual)     (20-40)  %


 


Atypical Lymphs %     %


 


Monocytes % (Manual)     (2-10)  %


 


Eosinophils % (Manual)     (0.7-5.8)  %


 


Basophils % (Manual)     (0.1-1.2)  


 


Platelet Estimate     


 


RBC Morph Comment     


 


PT   10.7   (8.0-13.0)  SECONDS


 


INR   1.00   


 


APTT   28   (22-36)  SECONDS


 


D-Dimer, Quantitative    1.11 H  (0.19-0.59)  mg/L


 


Sodium     (136-145)  mEq/L


 


Potassium     (3.5-5.1)  mEq/L


 


Chloride     ()  mEq/L


 


Carbon Dioxide     (21-32)  mEq/L


 


Anion Gap     (5-15)  


 


BUN     (7-18)  mg/dL


 


Creatinine     (0.55-1.02)  mg/dL


 


Est Cr Clr Drug Dosing     mL/min


 


Estimated GFR (MDRD)     (>60)  mL/min


 


BUN/Creatinine Ratio     (14-18)  


 


Glucose     ()  mg/dL


 


Hemoglobin A1c  5.60    (4.50-6.20)  %


 


Calcium     (8.5-10.1)  mg/dL


 


Magnesium     (1.8-2.4)  mg/dl


 


Total Bilirubin     (0.2-1.0)  mg/dL


 


AST     (15-37)  U/L


 


ALT     (14-59)  U/L


 


Alkaline Phosphatase     ()  U/L


 


Troponin I     (0.00-0.056)  ng/mL


 


NT-Pro-B Natriuret Pep     (0-125)  pg/mL


 


Total Protein     (6.4-8.2)  g/dl


 


Albumin     (3.4-5.0)  g/dl


 


Globulin     gm/dL


 


Albumin/Globulin Ratio     (1-2)  


 


Free T4     (0.76-1.46)  ng/dL


 


TSH 3rd Generation     (0.358-3.74)  uIU/mL


 


Urine Color     (Yellow)  


 


Urine Appearance     (Clear)  


 


Urine pH     (5.0-8.0)  


 


Ur Specific Gravity     (1.005-1.030)  


 


Urine Protein     (Negative)  


 


Urine Glucose (UA)     (Negative)  


 


Urine Ketones     (Negative)  


 


Urine Occult Blood     (Negative)  


 


Urine Nitrite     (Negative)  


 


Urine Bilirubin     (Negative)  


 


Urine Urobilinogen     (0.2-1.0)  


 


Ur Leukocyte Esterase     (Negative)  


 


Urine RBC     (0-5)  /hpf


 


Urine WBC     (0-5)  /hpf


 


Ur Epithelial Cells     (0-5)  /hpf


 


Urine Bacteria     (FEW)  /hpf


 


Urine Mucus     (FEW)  /hpf














  04/01/18 04/01/18 04/01/18 Range/Units





  15:05 15:05 15:20 


 


WBC     (3.98-10.04)  K/mm3


 


RBC     (3.98-5.22)  M/mm3


 


Hgb     (11.2-15.7)  gm/L


 


Hct     (34.1-44.9)  %


 


MCV     (79.4-94.8)  fl


 


MCH     (25.6-32.2)  pg


 


MCHC     (32.2-35.5)  g/dl


 


RDW Std Deviation     (36.4-46.3)  fL


 


Plt Count     (182-369)  K/mm3


 


MPV     (9.4-12.3)  fl


 


Neutrophils % (Manual)     (40-60)  %


 


Band Neutrophils %     (0-10)  %


 


Lymphocytes % (Manual)     (20-40)  %


 


Atypical Lymphs %     %


 


Monocytes % (Manual)     (2-10)  %


 


Eosinophils % (Manual)     (0.7-5.8)  %


 


Basophils % (Manual)     (0.1-1.2)  


 


Platelet Estimate     


 


RBC Morph Comment     


 


PT     (8.0-13.0)  SECONDS


 


INR     


 


APTT     (22-36)  SECONDS


 


D-Dimer, Quantitative     (0.19-0.59)  mg/L


 


Sodium     (136-145)  mEq/L


 


Potassium     (3.5-5.1)  mEq/L


 


Chloride     ()  mEq/L


 


Carbon Dioxide     (21-32)  mEq/L


 


Anion Gap     (5-15)  


 


BUN     (7-18)  mg/dL


 


Creatinine     (0.55-1.02)  mg/dL


 


Est Cr Clr Drug Dosing     mL/min


 


Estimated GFR (MDRD)     (>60)  mL/min


 


BUN/Creatinine Ratio     (14-18)  


 


Glucose     ()  mg/dL


 


Hemoglobin A1c     (4.50-6.20)  %


 


Calcium     (8.5-10.1)  mg/dL


 


Magnesium     (1.8-2.4)  mg/dl


 


Total Bilirubin     (0.2-1.0)  mg/dL


 


AST     (15-37)  U/L


 


ALT     (14-59)  U/L


 


Alkaline Phosphatase     ()  U/L


 


Troponin I  < 0.017    (0.00-0.056)  ng/mL


 


NT-Pro-B Natriuret Pep     (0-125)  pg/mL


 


Total Protein     (6.4-8.2)  g/dl


 


Albumin     (3.4-5.0)  g/dl


 


Globulin     gm/dL


 


Albumin/Globulin Ratio     (1-2)  


 


Free T4   1.73 H   (0.76-1.46)  ng/dL


 


TSH 3rd Generation     (0.358-3.74)  uIU/mL


 


Urine Color    Yellow  (Yellow)  


 


Urine Appearance    Clear  (Clear)  


 


Urine pH    6.0  (5.0-8.0)  


 


Ur Specific Gravity    1.010  (1.005-1.030)  


 


Urine Protein    Negative  (Negative)  


 


Urine Glucose (UA)    Negative  (Negative)  


 


Urine Ketones    Negative  (Negative)  


 


Urine Occult Blood    Negative  (Negative)  


 


Urine Nitrite    Negative  (Negative)  


 


Urine Bilirubin    Negative  (Negative)  


 


Urine Urobilinogen    0.2  (0.2-1.0)  


 


Ur Leukocyte Esterase    Negative  (Negative)  


 


Urine RBC    0-5  (0-5)  /hpf


 


Urine WBC    0-5  (0-5)  /hpf


 


Ur Epithelial Cells    0-5  (0-5)  /hpf


 


Urine Bacteria    Few  (FEW)  /hpf


 


Urine Mucus    Not seen  (FEW)  /hpf











Meds: 


Medications











Generic Name Dose Route Start Last Admin





  Trade Name Freq  PRN Reason Stop Dose Admin


 


Acetaminophen  650 mg  04/01/18 15:33  





  Tylenol  PO   





  Q4H PRN   





  Pain (Mild 1-3)/fever   





     





     





     


 


Acetaminophen  487.5 mg  04/01/18 21:00  04/02/18 09:14





  Tylenol  PO   487.5 mg





  BID [facility]   Administration





     





     





     





     


 


Hydrocodone Bitart/Acetaminophen  1 tab  04/01/18 15:33  





  Norco 325-5 Mg  PO   





  Q4H PRN   





  Pain (moderate 4-6)   





     





     





     


 


Albuterol/Ipratropium  3 ml  04/01/18 15:33  





  Duoneb 3.0-0.5 Mg/3 Ml  NEB   





  Q4H PRN   





  Shortness Of Breath/wheezing   





     





     





     


 


Apixaban  5 mg  04/01/18 21:00  04/02/18 09:11





  Eliquis  PO   5 mg





  BID [facility]   Administration





     





     





     





     


 


Bisacodyl  5 mg  04/01/18 15:33  





  Dulcolax  PO   





  DAILY PRN   





  Constipation   





     





     





     


 


Citalopram Hydrobromide  40 mg  04/02/18 09:00  04/02/18 09:11





  Celexa  PO   Not Given





  DAILY [facility]   





     





     





     





     


 


Docusate Sodium  100 mg  04/01/18 15:33  





  Colace  PO   





  BID PRN   





  Constipation   





     





     





     


 


Hydralazine HCl  20 mg  04/01/18 15:32  





  Apresoline  IVPUSH   





  Q4H PRN   





  Hypertension   





     





     





     


 


Hydrochlorothiazide  12.5 mg  04/02/18 09:00  04/02/18 09:13





  Hydrochlorothiazide  PO   Not Given





  BID [facility]   





     





     





     





     


 


Diltiazem HCl 125 mg/ Sodium  125 mls @ 5 mls/hr  04/01/18 15:15  04/01/18 20:25





  Chloride  IV   0 mg/hr





  TITRATE [facility]   0 mls/hr





     Titration





     





  Protocol   





  5 MG/HR   


 


Promethazine HCl 12.5 mg/  50.5 mls @ 100 mls/hr  04/01/18 15:33  





  Sodium Chloride  IV   





  Q6H PRN   





  Nausea/Vomiting   





     





     





     


 


Levothyroxine Sodium  150 mcg  04/02/18 06:00  04/02/18 06:22





  Levothyroxine  PO   150 mcg





  ACBRK [facility]   Administration





     





     





     





     


 


Lisinopril  20 mg  04/02/18 07:26  04/02/18 09:16





  Prinivil  PO   Not Given





  BID [facility]   





     





     





     





     


 


Lorazepam  1 mg  04/01/18 15:33  





  Ativan  IV   





  Q6H PRN   





  Anxiety   





     





     





     


 


Metoprolol Tartrate  5 mg  04/01/18 15:32  





  Lopressor  IVPUSH   





  Q4H PRN   





  Tachycardia   





     





     





     


 


Metoprolol Tartrate  50 mg  04/01/18 21:00  04/02/18 09:13





  Lopressor  PO   50 mg





  Q12HR [facility]   Administration





     





     





     





     


 


Ondansetron HCl  4 mg  04/01/18 15:33  





  Zofran  IV   





  Q6H PRN   





  Nausea/Vomiting   





     





     





     


 


Polyethylene Glycol  17 gm  04/01/18 15:33  





  Miralax  PO   





  DAILY PRN   





  Constipation   





     





     





     


 


Potassium Chloride  1 dose  04/01/18 15:45  





  Pharmacy To Dose - Potassium Replacement  .XX   





  ASDIRECTED DREW   





     





     





     





     


 


Senna/Docusate Sodium  1 tab  04/01/18 15:33  





  Senna Plus  PO   





  BID PRN   





  Constipation   





     





     





     


 


Sodium Chloride  10 ml  04/01/18 12:26  04/01/18 12:57





  Saline Flush  FLUSH   10 ml





  ASDIRECTED PRN   Administration





  Keep Vein Open   





     





     





     


 


Sodium Chloride  10 ml  04/01/18 14:18  04/01/18 14:42





  Saline Flush  FLUSH   10 ml





  ONETIME PRN   Administration





  IV FLUSH   





     





     





     


 


Temazepam  7.5 mg  04/01/18 15:33  





  [place]  PO   





  BEDTIME PRN   





  Sleep   





     





     





     














Discontinued Medications














Generic Name Dose Route Start Last Admin





  Trade Name Freq  PRN Reason Stop Dose Admin


 


Aspirin  324 mg  04/01/18 12:43  04/01/18 12:55





  Aspirin  PO  04/01/18 12:44  324 mg





  ONETIME ONE   Administration





     





     





     





     


 


Diltiazem HCl  20 mg  04/01/18 12:43  04/01/18 12:52





  Diltiazem  IVPUSH  04/01/18 12:44  20 mg





  ONETIME ONE   Administration





     





     





     





     


 


Diltiazem HCl  20 mg  04/01/18 13:16  04/01/18 13:31





  Diltiazem  IVPUSH  04/01/18 13:17  20 mg





  ONETIME ONE   Administration





     





     





     





     


 


Diltiazem HCl  180 mg  04/01/18 13:19  04/01/18 13:33





  Cardizem Cd  PO  04/01/18 13:20  180 mg





  ONETIME ONE   Administration





     





     





     





     


 


Diltiazem HCl  10 mg  04/01/18 15:11  04/01/18 15:36





  Diltiazem  IVPUSH  04/01/18 15:12  10 mg





  ONETIME ONE   Administration





     





     





     





     


 


Magnesium Sulfate 2 gm/ Premix  50 mls @ 25 mls/hr  04/01/18 12:45  04/01/18 12:

56





  IV  04/01/18 14:44  25 mls/hr





  ONETIME ONE   Administration





     





     





     





     


 


Sodium Chloride  1,000 mls @ 250 mls/hr  04/01/18 12:45  04/01/18 12:58





  Normal Saline  IV   250 mls/hr





  ASDIRECTED DREW   Administration





     





     





     





     


 


Sodium Chloride  250 mls @ 80 mls/min  04/01/18 14:30  04/01/18 14:51





  Normal Saline  IV   80 mls/min





  ASDIRECTED DREW   Administration





     





     





     





     


 


Sodium Chloride  500 mls @ 999 mls/hr  04/02/18 02:16  04/02/18 02:30





  Normal Saline  IV  04/02/18 02:46  999 mls/hr





  .BOLUS ONE   Administration





     





     





     





     


 


Sodium Chloride  Confirm  04/02/18 02:18  04/02/18 02:28





  Normal Saline  Administered  04/02/18 02:19  Not Given





  Dose   





  500 mls @ as directed   





  .ROUTE   





  .STK-MED ONE   





     





     





     





     


 


Iopamidol  100 ml  04/01/18 14:18  04/01/18 14:42





  Isovue-370 (76%)  IVPUSH  04/01/18 14:19  100 ml





  ONETIME ONE   Administration





     





     





     





     


 


Iopamidol  50 ml  04/01/18 14:18  04/01/18 14:42





  Isovue-370 (76%)  IVPUSH  04/01/18 14:19  50 ml





  ONETIME ONE   Administration





     





     





     





     


 


Levothyroxine Sodium  175 mcg  04/02/18 06:00  





  Levothyroxine  PO   





  ACBRK DREW   





     





     





     





     


 


Non-Formulary Medication  1 tab  04/01/18 21:00  04/01/18 20:27





  Lisinopril/Hydrochlorothiazide [Lisinopril-Hctz 20-12.5 Mg Tab]  PO   Not 

Given





  BID DREW   





     





     





     





     


 


Potassium Chloride  40 meq  04/01/18 17:00  04/01/18 20:24





  Klor-Con M20  PO  04/01/18 21:01  40 meq





  Q4H DREW   Administration





     





     





     





     














- Re-Assessments/Exams


Free Text/Narrative Re-Assessment/Exam: 


EKG atrial fibrillation rate 147 variable with no acute ST changes. 





BP and SPO2 stable.  





Orderd peripheral IVwith asa 324mg PO, diltiazem 20 mg IVP, mag 2 grams IV, and 

NS 250ml/hr bolus.  





Initial labs and studies include: CBC, C14, Mg, Pro BNP, Coag studies, troponin

, tsh, ua, and cxr.  





Will provide NS bolus 250mls with diltiazem push. 





HR has decreased to 100-128 variable. Remains A-Fib.  Will push another 20mgs. 

This will be pushed as 10mg boluses to a total of 20mgs.  In addition ordered 

cardiazem cd 180mgpo.  





CXR: Enlarged heart with no acute findings. Reviewed with Dr. Reyez.  Final 

interpretation is pending. 





Labs reviewed: CBC essentially normal. Sodium 139, potassium 3.8, CO2 24, G 17.8

, 24, creatinine 0.9, glucose 120, magnesium low 1.6, troponin less than 0.017. 

ProBNP is 348. TSH is low at 0.318. 





D-Dimer is 1.11. CT angio of the chest PE protocol ordered. 





04/01/18 13:57 2nd bolus of diltiazem. HR decreased into the 90's with a-fib as 

rhythm. /64.  Will hold off in starting drip at this time. She just 

received oral cardiazem with last bolus.Will see if this provides optimum rate 

control.  If no rate control will potentially add additional bolus of 

dilitiazem and/or gtt with possible admission.CT chest pending. 





Patient has inadquate rate control after CT.  's.  BP normotensive.  

Ordered 10mg IVP and cardizem gtt.  





CT of the abdomen and pelvis:  No findings of pulmonary embolism. Questionable 

partially visualized take one calculus within the left kidney. Noncontrast CT 

study could be obtained to clinically confirm if clinically needed. This can be 

performed on emergency. Other incidental findings.





04/01/18 15:32 Discussed labs and CT results with the patient.  Requests to be 

admitted for further diagnositic testing in house.  





1533 Discussed with Dr. Tong [facility]. He has accepted the 

patient. MCG completed.  Patient has not been administered the Diltiazem 10 mg 

IVP and gtt. 2nd troponin is pending. 











Departure





- Departure


Time of Disposition: 15:35


Disposition: Admitted As Inpatient 68


Condition: Good


Clinical Impression: 


 New onset atrial fibrillation








- My Orders


Last 24 Hours: 


My Active Orders





04/01/18 12:25


Oxygen Therapy [RC] .PRN 





04/01/18 12:26


Sodium Chloride 0.9% [Saline Flush]   10 ml FLUSH ASDIRECTED PRN 


Peripheral IV Insertion Adult [OM.PC] Routine 





04/01/18 12:50


DD [D-DIMER QUANTITATIVE] [COAG] Stat 





04/01/18 14:18


Sodium Chloride 0.9% [Saline Flush]   10 ml FLUSH ONETIME PRN 





04/01/18 15:15


Diltiazem 125 mg   Sodium Chloride 0.9% [Normal Saline] 100 ml IV TITRATE 














- Assessment/Plan


Last 24 Hours: 


My Active Orders





04/01/18 12:25


Oxygen Therapy [RC] .PRN 





04/01/18 12:26


Sodium Chloride 0.9% [Saline Flush]   10 ml FLUSH ASDIRECTED PRN 


Peripheral IV Insertion Adult [OM.PC] Routine 





04/01/18 12:50


DD [D-DIMER QUANTITATIVE] [COAG] Stat 





04/01/18 14:18


Sodium Chloride 0.9% [Saline Flush]   10 ml FLUSH ONETIME PRN 





04/01/18 15:15


Diltiazem 125 mg   Sodium Chloride 0.9% [Normal Saline] 100 ml IV TITRATE
[FreeTextEntry1] : Patient was first treated about 30 years ago.  She states the symptoms began when she was first going through menopause but then accelerated after the problems with her .  The only medication she is ever been on has been Zoloft and lorazepam.